# Patient Record
Sex: MALE | Race: WHITE | NOT HISPANIC OR LATINO | URBAN - METROPOLITAN AREA
[De-identification: names, ages, dates, MRNs, and addresses within clinical notes are randomized per-mention and may not be internally consistent; named-entity substitution may affect disease eponyms.]

---

## 2023-08-14 ENCOUNTER — HOSPITAL ENCOUNTER (OUTPATIENT)
Dept: DATA CONVERSION | Facility: HOSPITAL | Age: 53
Discharge: HOME | End: 2023-08-14

## 2023-08-14 DIAGNOSIS — S63.501D UNSPECIFIED SPRAIN OF RIGHT WRIST, SUBSEQUENT ENCOUNTER: ICD-10-CM

## 2023-08-14 DIAGNOSIS — R93.89 ABNORMAL FINDINGS ON DIAGNOSTIC IMAGING OF OTHER SPECIFIED BODY STRUCTURES: ICD-10-CM

## 2023-08-14 DIAGNOSIS — M25.531 PAIN IN RIGHT WRIST: ICD-10-CM

## 2023-08-14 DIAGNOSIS — S62.001D: ICD-10-CM

## 2023-09-16 VITALS
BODY MASS INDEX: 29.51 KG/M2 | DIASTOLIC BLOOD PRESSURE: 72 MMHG | WEIGHT: 188 LBS | HEIGHT: 67 IN | SYSTOLIC BLOOD PRESSURE: 112 MMHG | HEART RATE: 91 BPM

## 2023-10-04 PROBLEM — J45.909 ASTHMA (HHS-HCC): Status: ACTIVE | Noted: 2022-03-07

## 2023-10-04 PROBLEM — R07.9 CHEST PAIN: Status: ACTIVE | Noted: 2022-03-07

## 2023-10-04 PROBLEM — S69.90XA INJURY OF WRIST: Status: ACTIVE | Noted: 2023-10-04

## 2023-10-04 PROBLEM — R53.83 FATIGUE: Status: ACTIVE | Noted: 2020-03-09

## 2023-10-04 PROBLEM — R93.89 ABNORMAL X-RAY: Status: ACTIVE | Noted: 2023-10-04

## 2023-10-04 PROBLEM — S62.009A CLOSED FRACTURE OF SCAPHOID: Status: ACTIVE | Noted: 2023-10-04

## 2023-10-04 PROBLEM — R05.9 COUGH: Status: ACTIVE | Noted: 2020-03-19

## 2023-10-04 PROBLEM — G43.909 MIGRAINE: Status: ACTIVE | Noted: 2023-10-04

## 2023-10-04 PROBLEM — R25.1 TREMOR: Status: ACTIVE | Noted: 2023-07-07

## 2023-10-04 PROBLEM — G47.00 INSOMNIA: Status: ACTIVE | Noted: 2022-03-07

## 2023-10-04 PROBLEM — E66.9 OBESITY: Status: ACTIVE | Noted: 2022-03-03

## 2023-10-04 PROBLEM — S63.509A SPRAIN OF WRIST: Status: ACTIVE | Noted: 2023-10-04

## 2023-10-04 PROBLEM — F31.9 BIPOLAR DISORDER (MULTI): Status: ACTIVE | Noted: 2023-10-04

## 2023-10-04 PROBLEM — R10.32 LT GROIN PAIN: Status: ACTIVE | Noted: 2018-11-26

## 2023-10-04 PROBLEM — E66.3 OVERWEIGHT: Status: ACTIVE | Noted: 2020-03-09

## 2023-10-04 PROBLEM — M54.6 THORACIC BACK PAIN: Status: ACTIVE | Noted: 2019-02-13

## 2023-10-04 PROBLEM — M25.531 RIGHT WRIST PAIN: Status: ACTIVE | Noted: 2023-10-04

## 2023-10-04 PROBLEM — R63.4 WEIGHT LOSS: Status: ACTIVE | Noted: 2023-07-11

## 2023-10-04 RX ORDER — LAMOTRIGINE 150 MG/1
150 TABLET ORAL 2 TIMES DAILY
COMMUNITY
Start: 2018-11-26

## 2023-10-04 RX ORDER — PROPRANOLOL HYDROCHLORIDE 10 MG/1
10 TABLET ORAL 2 TIMES DAILY
COMMUNITY
Start: 2023-07-07

## 2023-10-04 RX ORDER — ALBUTEROL SULFATE 90 UG/1
2 AEROSOL, METERED RESPIRATORY (INHALATION) 4 TIMES DAILY PRN
COMMUNITY
Start: 2023-03-14 | End: 2023-12-18 | Stop reason: SDUPTHER

## 2023-10-04 RX ORDER — ESCITALOPRAM OXALATE 10 MG/1
10 TABLET ORAL DAILY
COMMUNITY

## 2023-10-06 ENCOUNTER — OFFICE VISIT (OUTPATIENT)
Dept: PRIMARY CARE | Facility: CLINIC | Age: 53
End: 2023-10-06
Payer: COMMERCIAL

## 2023-10-06 VITALS
SYSTOLIC BLOOD PRESSURE: 112 MMHG | OXYGEN SATURATION: 100 % | DIASTOLIC BLOOD PRESSURE: 74 MMHG | BODY MASS INDEX: 27.41 KG/M2 | HEART RATE: 70 BPM | WEIGHT: 175 LBS

## 2023-10-06 DIAGNOSIS — G47.00 INSOMNIA, UNSPECIFIED TYPE: Primary | ICD-10-CM

## 2023-10-06 PROCEDURE — 1036F TOBACCO NON-USER: CPT | Performed by: FAMILY MEDICINE

## 2023-10-06 PROCEDURE — 99213 OFFICE O/P EST LOW 20 MIN: CPT | Performed by: FAMILY MEDICINE

## 2023-10-06 NOTE — PROGRESS NOTES
Subjective   Patient ID: Portillo Barrios is a 53 y.o. male who presents for Insomnia (Pt states insomnia is chronic but the past few weeks are worse/Average sleep nightly 3-4 hours).    HPI Here with a 2 to 3-month history of poor sleep.  Patient works first shift and has to be at work by 5 to 5:30 in the morning so he is getting up at 430.  He tries to go to bed about 8:00 at night and he tosses and turns a lot.  He does not take naps after work.This is been ongoing for the past few months.  Before that he did not seem to have any problems initiating sleep or maintaining sleep.  Patient is on a mood stabilizer in the form of lamotrigine.He takes the medicine in the morning.Been on this medicine for years and he is stable.A med review shows that this medication can cause either insomnia or somnolence.    Review of Systems  Constitutional: Patient is negative for fever, fatigue, weight change.  HEENT: Patient is negative for change in vision, hearing, swallow.  Cardio: Patient is negative for chest pain, lower extremity edema.  Pulmonary: Patient is negative for cough, shortness of breath.  Neuro: Patient is positive for insomnia.    Objective   /74   Pulse 70   Wt 79.4 kg (175 lb)   SpO2 100%   BMI 27.41 kg/m²     Physical Exam  General: Awake and alert no apparent distress.  HEENT: Moist oral mucosa no cervical lymphadenopathy.  Cardio: Heart S1-S2 no murmur rub or gallop.  Assessment/Plan   Problem List Items Addressed This Visit             ICD-10-CM    Insomnia - Primary G47.00     Insomnia: Patient needs better control.  Patient was advised to try using OTC diphenhydramine as needed to try to reestablish his normal sleep-wake cycle.  If he is no better in a month he will follow-up here.

## 2023-12-18 ENCOUNTER — TELEPHONE (OUTPATIENT)
Dept: PRIMARY CARE | Facility: CLINIC | Age: 53
End: 2023-12-18
Payer: COMMERCIAL

## 2023-12-18 DIAGNOSIS — R05.9 COUGH, UNSPECIFIED TYPE: ICD-10-CM

## 2023-12-18 RX ORDER — ALBUTEROL SULFATE 90 UG/1
2 AEROSOL, METERED RESPIRATORY (INHALATION) 4 TIMES DAILY PRN
Qty: 18 G | Refills: 1 | Status: SHIPPED | OUTPATIENT
Start: 2023-12-18 | End: 2024-01-25 | Stop reason: SDUPTHER

## 2024-01-25 ENCOUNTER — OFFICE VISIT (OUTPATIENT)
Dept: PRIMARY CARE | Facility: CLINIC | Age: 54
End: 2024-01-25
Payer: COMMERCIAL

## 2024-01-25 ENCOUNTER — TELEPHONE (OUTPATIENT)
Dept: PRIMARY CARE | Facility: CLINIC | Age: 54
End: 2024-01-25

## 2024-01-25 VITALS
HEART RATE: 98 BPM | SYSTOLIC BLOOD PRESSURE: 144 MMHG | DIASTOLIC BLOOD PRESSURE: 88 MMHG | BODY MASS INDEX: 27.88 KG/M2 | OXYGEN SATURATION: 98 % | WEIGHT: 178 LBS

## 2024-01-25 DIAGNOSIS — Z00.00 ROUTINE GENERAL MEDICAL EXAMINATION AT A HEALTH CARE FACILITY: ICD-10-CM

## 2024-01-25 DIAGNOSIS — Z12.5 SPECIAL SCREENING FOR MALIGNANT NEOPLASM OF PROSTATE: ICD-10-CM

## 2024-01-25 DIAGNOSIS — R05.9 COUGH, UNSPECIFIED TYPE: ICD-10-CM

## 2024-01-25 DIAGNOSIS — R05.3 CHRONIC COUGH: Primary | ICD-10-CM

## 2024-01-25 DIAGNOSIS — E66.3 OVERWEIGHT: ICD-10-CM

## 2024-01-25 DIAGNOSIS — J45.40 MODERATE PERSISTENT ASTHMA, UNSPECIFIED WHETHER COMPLICATED (HHS-HCC): ICD-10-CM

## 2024-01-25 PROCEDURE — 1036F TOBACCO NON-USER: CPT | Performed by: FAMILY MEDICINE

## 2024-01-25 PROCEDURE — 99213 OFFICE O/P EST LOW 20 MIN: CPT | Performed by: FAMILY MEDICINE

## 2024-01-25 RX ORDER — ALBUTEROL SULFATE 90 UG/1
2 AEROSOL, METERED RESPIRATORY (INHALATION) 4 TIMES DAILY PRN
Qty: 18 G | Refills: 1 | Status: SHIPPED | OUTPATIENT
Start: 2024-01-25 | End: 2024-03-20 | Stop reason: SDUPTHER

## 2024-01-25 RX ORDER — BUDESONIDE AND FORMOTEROL FUMARATE DIHYDRATE 160; 4.5 UG/1; UG/1
2 AEROSOL RESPIRATORY (INHALATION)
Qty: 10.2 EACH | Refills: 3 | Status: SHIPPED | OUTPATIENT
Start: 2024-01-25 | End: 2025-01-24

## 2024-01-25 ASSESSMENT — PAIN SCALES - GENERAL: PAINLEVEL: 0-NO PAIN

## 2024-01-25 ASSESSMENT — PATIENT HEALTH QUESTIONNAIRE - PHQ9
2. FEELING DOWN, DEPRESSED OR HOPELESS: NOT AT ALL
1. LITTLE INTEREST OR PLEASURE IN DOING THINGS: NOT AT ALL
SUM OF ALL RESPONSES TO PHQ9 QUESTIONS 1 AND 2: 0

## 2024-01-25 NOTE — PROGRESS NOTES
Subjective   Patient ID: Portillo Barrios is a 54 y.o. male who presents for Follow-up (Pt here for insomnia and asthma.).    HPI Patient is here for follow-up to difficulty breathing.  Patient has had an asthma since he was a teenager but it was primarily exercise-induced.  He used albuterol for rescue occasionally.  Patient states that since he had his COVID illness about 4 months ago he has found that he is exquisitely sensitive to scents and perfumes, and cold air.  He is using his albuterol rescue twice a day.  He also is complaining of a cough.    Review of Systems  Constitutional: Patient is positive for mild increase in weight of about 10 pounds. Patient is negative for fever, fatigue.  HEENT: Patient is negative for change in vision, hearing, swallow.  Cardio: Patient is negative for chest pain, lower extremity edema.  Pulmonary: Patient is positive for cough with mild shortness of breath and wheeze.  Objective   /88   Pulse 98   Wt 80.7 kg (178 lb)   SpO2 98%   BMI 27.88 kg/m²     Physical Exam  General: Awake and alert no apparent distress.  HEENT: Moist oral mucosa no cervical lymphadenopathy.  Cardio: Heart S1-S2 no murmur rub or gallop.  Pulmonary: Lungs are clear to auscultation bilaterally.  Assessment/Plan   Problem List Items Addressed This Visit             ICD-10-CM    Cough - Primary  Needs workup.  Will get a chest x-ray. R05.9    Relevant Medications    albuterol (Proventil HFA) 90 mcg/actuation inhaler    Other Relevant Orders    XR chest 2 views    Asthma   Moderate, persistent.  Will begin budesonide/formoterol.  Patient will then uses albuterol for rescue.  Patient will follow-up in about a month. J45.909    Relevant Medications    budesonide-formoteroL (Symbicort) 160-4.5 mcg/actuation inhaler

## 2024-02-01 DIAGNOSIS — R05.9 COUGH, UNSPECIFIED TYPE: Primary | ICD-10-CM

## 2024-02-01 RX ORDER — FLUTICASONE FUROATE AND VILANTEROL 100; 25 UG/1; UG/1
1 POWDER RESPIRATORY (INHALATION) DAILY
Qty: 60 EACH | Refills: 2 | Status: SHIPPED | OUTPATIENT
Start: 2024-02-01

## 2024-02-06 ENCOUNTER — OFFICE VISIT (OUTPATIENT)
Dept: PRIMARY CARE | Facility: CLINIC | Age: 54
End: 2024-02-06
Payer: COMMERCIAL

## 2024-02-06 VITALS
DIASTOLIC BLOOD PRESSURE: 84 MMHG | BODY MASS INDEX: 28.66 KG/M2 | HEART RATE: 107 BPM | WEIGHT: 183 LBS | OXYGEN SATURATION: 98 % | SYSTOLIC BLOOD PRESSURE: 146 MMHG

## 2024-02-06 DIAGNOSIS — R06.83 SNORING: Primary | ICD-10-CM

## 2024-02-06 PROCEDURE — 99213 OFFICE O/P EST LOW 20 MIN: CPT | Performed by: FAMILY MEDICINE

## 2024-02-06 PROCEDURE — 1036F TOBACCO NON-USER: CPT | Performed by: FAMILY MEDICINE

## 2024-02-06 ASSESSMENT — PATIENT HEALTH QUESTIONNAIRE - PHQ9
SUM OF ALL RESPONSES TO PHQ9 QUESTIONS 1 AND 2: 0
1. LITTLE INTEREST OR PLEASURE IN DOING THINGS: NOT AT ALL
2. FEELING DOWN, DEPRESSED OR HOPELESS: NOT AT ALL

## 2024-02-06 ASSESSMENT — PAIN SCALES - GENERAL: PAINLEVEL: 0-NO PAIN

## 2024-02-06 NOTE — PROGRESS NOTES
Subjective   Patient ID: Portillo Barrios is a 54 y.o. male who presents for Sleep apena  and Headache (Not sleeping, cannot work, too tired, cannot function, causing anxiety and irritability.).    HPI Here for poor sleep and anxiety.  Patient has known anxiety and is worried because of his poor sleep.  He has had insomnia for months.  He cannot get into see his pulmonologist for about 3 months.  He states that his wife has witnessed him snoring quite loudly and has had observed apneic episodes.    Review of Systems  Constitutional: Patient is positive for fatigue.  He is somnolent during the day.  He is negative for fever, weight change.  HEENT: Patient is negative for change in vision, hearing, swallow.  Cardio: Patient is negative for chest pain, lower extremity edema.  Pulmonary: Patient is negative for cough, shortness of breath.  Neuro: Patient is positive for snoring.  Objective   /84   Pulse 107   Wt 83 kg (183 lb)   SpO2 98%   BMI 28.66 kg/m²     Physical Exam  General: Awake and alert no apparent distress.  HEENT: Moist oral mucosa no cervical lymphadenopathy.  Cardio: Heart S1-S2 no murmur rub or gallop.  Pulmonary: Lungs clear to auscultation bilaterally.  Assessment/Plan   Problem List Items Addressed This Visit    None  Visit Diagnoses         Codes    Snoring    -  Primary  Needs workup.  Patient will follow-up with sleep specialist within this practice for a possible sleep study. R06.83

## 2024-02-29 ENCOUNTER — OFFICE VISIT (OUTPATIENT)
Dept: PRIMARY CARE | Facility: CLINIC | Age: 54
End: 2024-02-29
Payer: COMMERCIAL

## 2024-02-29 VITALS
SYSTOLIC BLOOD PRESSURE: 128 MMHG | OXYGEN SATURATION: 97 % | DIASTOLIC BLOOD PRESSURE: 80 MMHG | BODY MASS INDEX: 28.19 KG/M2 | WEIGHT: 180 LBS | RESPIRATION RATE: 16 BRPM | HEART RATE: 65 BPM

## 2024-02-29 DIAGNOSIS — R53.82 CHRONIC FATIGUE: Primary | ICD-10-CM

## 2024-02-29 PROCEDURE — 99214 OFFICE O/P EST MOD 30 MIN: CPT | Performed by: FAMILY MEDICINE

## 2024-02-29 PROCEDURE — 1036F TOBACCO NON-USER: CPT | Performed by: FAMILY MEDICINE

## 2024-02-29 ASSESSMENT — PATIENT HEALTH QUESTIONNAIRE - PHQ9
SUM OF ALL RESPONSES TO PHQ9 QUESTIONS 1 AND 2: 0
2. FEELING DOWN, DEPRESSED OR HOPELESS: NOT AT ALL
1. LITTLE INTEREST OR PLEASURE IN DOING THINGS: NOT AT ALL

## 2024-02-29 ASSESSMENT — ENCOUNTER SYMPTOMS
OCCASIONAL FEELINGS OF UNSTEADINESS: 0
LOSS OF SENSATION IN FEET: 0
DEPRESSION: 0

## 2024-02-29 ASSESSMENT — PAIN SCALES - GENERAL: PAINLEVEL: 0-NO PAIN

## 2024-02-29 NOTE — ASSESSMENT & PLAN NOTE
I suspect possible obstructive sleep apnea.  Will set up home sleep study test.  Follow-up here in 3 to 4 weeks to review.

## 2024-02-29 NOTE — PROGRESS NOTES
Subjective   Patient ID: Portillo Barrios is a 54 y.o. male who presents for Insomnia (Discuss having a sleep study done).    HPI   Feels very fatigued throughout the day.  Does not feel rested when waking up from sleep. Snores on a regular basis.  Family members noticed episodes of apnea while sleeping. Often naps or falls asleep throughout the day.    Review of Systems  Denies headache, blurred vision, chest pain, shortness of breath, nausea or vomiting, change in bowel habits or leg pain or swelling.    Objective   /80 (BP Location: Left arm, Patient Position: Sitting, BP Cuff Size: Large adult)   Pulse 65   Resp 16   Wt 81.6 kg (180 lb)   SpO2 97%   BMI 28.19 kg/m²     Physical Exam  Vitals and nurse's notes reviewed  General: no acute distress  HEENT: Normal  Neck: Supple  Lungs: Clear  Cardio: RRR w/o murmur  Extremities: No edema, no calf tenderness  Neuro: Alert and oriented with no focal deficits    Assessment/Plan   Problem List Items Addressed This Visit             ICD-10-CM       Medium    Chronic fatigue - Primary R53.82     I suspect possible obstructive sleep apnea.  Will set up home sleep study test.  Follow-up here in 3 to 4 weeks to review.

## 2024-03-12 ENCOUNTER — LAB (OUTPATIENT)
Dept: LAB | Facility: LAB | Age: 54
End: 2024-03-12
Payer: COMMERCIAL

## 2024-03-12 ENCOUNTER — HOSPITAL ENCOUNTER (OUTPATIENT)
Dept: RADIOLOGY | Facility: CLINIC | Age: 54
Discharge: HOME | End: 2024-03-12
Payer: COMMERCIAL

## 2024-03-12 DIAGNOSIS — Z12.5 SPECIAL SCREENING FOR MALIGNANT NEOPLASM OF PROSTATE: ICD-10-CM

## 2024-03-12 DIAGNOSIS — R05.3 CHRONIC COUGH: ICD-10-CM

## 2024-03-12 DIAGNOSIS — Z00.00 ROUTINE GENERAL MEDICAL EXAMINATION AT A HEALTH CARE FACILITY: ICD-10-CM

## 2024-03-12 LAB
ALBUMIN SERPL-MCNC: 4.3 G/DL (ref 3.5–5)
ALP BLD-CCNC: 53 U/L (ref 35–125)
ALT SERPL-CCNC: 31 U/L (ref 5–40)
ANION GAP SERPL CALC-SCNC: 12 MMOL/L
APPEARANCE UR: CLEAR
AST SERPL-CCNC: 28 U/L (ref 5–40)
BASOPHILS # BLD AUTO: 0.08 X10*3/UL (ref 0–0.1)
BASOPHILS NFR BLD AUTO: 1 %
BILIRUB SERPL-MCNC: 1.4 MG/DL (ref 0.1–1.2)
BILIRUB UR STRIP.AUTO-MCNC: NEGATIVE MG/DL
BUN SERPL-MCNC: 11 MG/DL (ref 8–25)
CALCIUM SERPL-MCNC: 9.4 MG/DL (ref 8.5–10.4)
CHLORIDE SERPL-SCNC: 101 MMOL/L (ref 97–107)
CHOLEST SERPL-MCNC: 135 MG/DL (ref 133–200)
CHOLEST/HDLC SERPL: 3.4 {RATIO}
CO2 SERPL-SCNC: 27 MMOL/L (ref 24–31)
COLOR UR: NORMAL
CREAT SERPL-MCNC: 1.3 MG/DL (ref 0.4–1.6)
EGFRCR SERPLBLD CKD-EPI 2021: 66 ML/MIN/1.73M*2
EOSINOPHIL # BLD AUTO: 0.19 X10*3/UL (ref 0–0.7)
EOSINOPHIL NFR BLD AUTO: 2.5 %
ERYTHROCYTE [DISTWIDTH] IN BLOOD BY AUTOMATED COUNT: 13.1 % (ref 11.5–14.5)
EST. AVERAGE GLUCOSE BLD GHB EST-MCNC: 111 MG/DL
GLUCOSE SERPL-MCNC: 99 MG/DL (ref 65–99)
GLUCOSE UR STRIP.AUTO-MCNC: NORMAL MG/DL
HBA1C MFR BLD: 5.5 %
HCT VFR BLD AUTO: 48.7 % (ref 41–52)
HDLC SERPL-MCNC: 40 MG/DL
HGB BLD-MCNC: 16.2 G/DL (ref 13.5–17.5)
IMM GRANULOCYTES # BLD AUTO: 0.03 X10*3/UL (ref 0–0.7)
IMM GRANULOCYTES NFR BLD AUTO: 0.4 % (ref 0–0.9)
KETONES UR STRIP.AUTO-MCNC: NEGATIVE MG/DL
LDLC SERPL CALC-MCNC: 72 MG/DL (ref 65–130)
LEUKOCYTE ESTERASE UR QL STRIP.AUTO: NEGATIVE
LYMPHOCYTES # BLD AUTO: 1.58 X10*3/UL (ref 1.2–4.8)
LYMPHOCYTES NFR BLD AUTO: 20.4 %
MCH RBC QN AUTO: 30.2 PG (ref 26–34)
MCHC RBC AUTO-ENTMCNC: 33.3 G/DL (ref 32–36)
MCV RBC AUTO: 91 FL (ref 80–100)
MONOCYTES # BLD AUTO: 0.72 X10*3/UL (ref 0.1–1)
MONOCYTES NFR BLD AUTO: 9.3 %
NEUTROPHILS # BLD AUTO: 5.15 X10*3/UL (ref 1.2–7.7)
NEUTROPHILS NFR BLD AUTO: 66.4 %
NITRITE UR QL STRIP.AUTO: NEGATIVE
NRBC BLD-RTO: 0 /100 WBCS (ref 0–0)
PH UR STRIP.AUTO: 6.5 [PH]
PLATELET # BLD AUTO: 247 X10*3/UL (ref 150–450)
POTASSIUM SERPL-SCNC: 4.5 MMOL/L (ref 3.4–5.1)
PROT SERPL-MCNC: 6.4 G/DL (ref 5.9–7.9)
PROT UR STRIP.AUTO-MCNC: NEGATIVE MG/DL
PSA SERPL-MCNC: 0.5 NG/ML
RBC # BLD AUTO: 5.36 X10*6/UL (ref 4.5–5.9)
RBC # UR STRIP.AUTO: NEGATIVE /UL
SODIUM SERPL-SCNC: 140 MMOL/L (ref 133–145)
SP GR UR STRIP.AUTO: 1.02
TRIGL SERPL-MCNC: 117 MG/DL (ref 40–150)
UROBILINOGEN UR STRIP.AUTO-MCNC: NORMAL MG/DL
WBC # BLD AUTO: 7.8 X10*3/UL (ref 4.4–11.3)

## 2024-03-12 PROCEDURE — 81003 URINALYSIS AUTO W/O SCOPE: CPT

## 2024-03-12 PROCEDURE — 84153 ASSAY OF PSA TOTAL: CPT

## 2024-03-12 PROCEDURE — 83036 HEMOGLOBIN GLYCOSYLATED A1C: CPT

## 2024-03-12 PROCEDURE — 71046 X-RAY EXAM CHEST 2 VIEWS: CPT

## 2024-03-12 PROCEDURE — 36415 COLL VENOUS BLD VENIPUNCTURE: CPT

## 2024-03-12 PROCEDURE — 80061 LIPID PANEL: CPT

## 2024-03-12 PROCEDURE — 80053 COMPREHEN METABOLIC PANEL: CPT

## 2024-03-12 PROCEDURE — 85025 COMPLETE CBC W/AUTO DIFF WBC: CPT

## 2024-03-12 PROCEDURE — 71046 X-RAY EXAM CHEST 2 VIEWS: CPT | Performed by: RADIOLOGY

## 2024-03-20 ENCOUNTER — OFFICE VISIT (OUTPATIENT)
Dept: PRIMARY CARE | Facility: CLINIC | Age: 54
End: 2024-03-20
Payer: COMMERCIAL

## 2024-03-20 VITALS
OXYGEN SATURATION: 97 % | BODY MASS INDEX: 29.57 KG/M2 | SYSTOLIC BLOOD PRESSURE: 144 MMHG | DIASTOLIC BLOOD PRESSURE: 84 MMHG | HEART RATE: 66 BPM | WEIGHT: 184 LBS | HEIGHT: 66 IN

## 2024-03-20 DIAGNOSIS — J45.909 ASTHMA WITHOUT STATUS ASTHMATICUS WITHOUT COMPLICATION, UNSPECIFIED ASTHMA SEVERITY, UNSPECIFIED WHETHER PERSISTENT (HHS-HCC): ICD-10-CM

## 2024-03-20 DIAGNOSIS — Z23 IMMUNIZATION DUE: ICD-10-CM

## 2024-03-20 DIAGNOSIS — Z00.00 WELL ADULT EXAM: ICD-10-CM

## 2024-03-20 DIAGNOSIS — R53.82 CHRONIC FATIGUE: Primary | ICD-10-CM

## 2024-03-20 DIAGNOSIS — R05.9 COUGH, UNSPECIFIED TYPE: ICD-10-CM

## 2024-03-20 PROCEDURE — 99396 PREV VISIT EST AGE 40-64: CPT | Performed by: FAMILY MEDICINE

## 2024-03-20 PROCEDURE — 90750 HZV VACC RECOMBINANT IM: CPT | Performed by: FAMILY MEDICINE

## 2024-03-20 PROCEDURE — 1036F TOBACCO NON-USER: CPT | Performed by: FAMILY MEDICINE

## 2024-03-20 PROCEDURE — 93000 ELECTROCARDIOGRAM COMPLETE: CPT | Performed by: FAMILY MEDICINE

## 2024-03-20 PROCEDURE — 90471 IMMUNIZATION ADMIN: CPT | Performed by: FAMILY MEDICINE

## 2024-03-20 RX ORDER — ALBUTEROL SULFATE 90 UG/1
2 AEROSOL, METERED RESPIRATORY (INHALATION) EVERY 6 HOURS PRN
Qty: 18 G | Refills: 1 | Status: SHIPPED | OUTPATIENT
Start: 2024-03-20

## 2024-03-20 ASSESSMENT — PATIENT HEALTH QUESTIONNAIRE - PHQ9
1. LITTLE INTEREST OR PLEASURE IN DOING THINGS: NOT AT ALL
SUM OF ALL RESPONSES TO PHQ9 QUESTIONS 1 AND 2: 0
2. FEELING DOWN, DEPRESSED OR HOPELESS: NOT AT ALL

## 2024-03-20 ASSESSMENT — PAIN SCALES - GENERAL: PAINLEVEL: 0-NO PAIN

## 2024-03-20 NOTE — PROGRESS NOTES
Subjective   Patient ID: Portillo Barrios is a 54 y.o. male who presents for Annual Exam (Colonoscopy  6/21/2022;  PSA done 3/12/2024/Chest discomfort and rash on chest earlier today;  rash and discomfort resolved now).    HPI Here for complete physical exam.  Patient has a history of weather induced shortness of breath . He uses albuterol MDI as needed . He uses less than 2 MDI per year .  A recent chest x-ray for shortness of breath was negative.  Patient sees a specialist for mood variability. He is currently on lamotrigine..   Patient had a tetanus shot in 2014. He received Shingrix #1 in 2023.He has not been immunized against pneumococcal disease. He has been immunized against coronavirus x2. He has been immunized against influenza in 10/22.  Patient had a colonoscopy in 2022.   Patient has never used tobacco and does not use alcohol.    Review of Systems  Constitutional Symptoms: He is positive for weight loss. Is positive for fatigue.He is negative for fever, night sweats, weight loss, Weight loss due to diet, weight gain due to diet, unexpected weight gain, loss of appetite, increased appetite, headaches, abnormal activity level, Sleep Disturbance, Recent Illness.   Eyes: He is negative for blindness, blind spots, loss and blurring of vision, double vision, swelling, redness, pruritus, eye pain, discharge, dryness of eyes.   Ear, Nose, Mouth, Throat: He is negative for hearing loss, wearing hearing aids, tinnitus, ear pain, ear drainage, dizziness, allergies, nasal congestion, rhinorrhea, nasal obstruction, post nasal drip, nose bleeds, teeth problems, wearing dentures, mouth sores, gum disease, dysphagia, hoarseness, sore throat, tinnitus, sleep apnea.   Cardiovascular: He is negative for chest pain/pressure, radiation of pain, palpitations, shortness of breath, dyspnea on exertion, orthopnea, syncope, diaphoresis, cyanosis, edema.   Respiratory: He is positive for shortness of breath due to asthma.He is  "negative for shortness of breath, dyspnea on exertion, coughing, sputum, hemoptysis, wheezing, snoring.   Breast: He is negative for masses, nipple discharge, gynecomastia.   Gastrointestinal: He is negative for anorexia, indigestion, increased belching, food intolerance, use of antacids, nausea, hematemesis, jaundice, abdominal pain, change in bowel habits, diarrhea, constipation, abnormal stools, hematochezia, melena, blood in stool, increased flatus, hemorrhoids.   Male Genitourinary: He is negative for erectile dysfunction, frequency, nocturia, hesitancy, dribbling, Incontinence, dysuria, hematuria, Swelling of penis, testicular pain or swelling, Hematospermia, urethral discharge.   Musculoskeletal: He is negative for joint pain, joint swelling, joint warmth, joint redness, myalgias, cramps, nocturnal muscle cramps, weakness, stiffness, limitation of motion.   Integumentary: He is negative for change in mole, skin trouble or rash, itching, dryness, loss of hair, hirsutism.   Neurological: He is negative for headache, speech difficulty, numbness, tingling, weakness, paralysis, tremors, dizziness, syncope, balance problems, memory loss.   Psychiatric: Is positive for mood variability. He is negative for depression, moodiness, anhedonia, change in sleep pattern, disturbing thoughts or feelings, change in libido, suicidal thoughts or attempts, anxiety, panic attacks, obsessive thoughts, compulsions, hyperactivity.   Endocrine: He is negative for weight gain, heat or cold intolerance, excessive sweating, polydipsia.   Hematologic/Lymphatic: He is negative for bruising, abnormal bleeding, bleeding gums, nose bleeds, swollen glands.  Objective   /84   Pulse 66   Ht 1.67 m (5' 5.75\")   Wt 83.5 kg (184 lb)   SpO2 97%   BMI 29.92 kg/m²     Physical Exam  General Appearance: Pt is in no acute distress. He is well nourished, well developed. The patient is awake and alert and appears stated age.  Head: Pt's hair " pattern is normal for patients age and The scalp is normal . The skull is normocephalic, atraumatic. The face is unremarkable with no facial droop. Palpation of the head reveals no tenderness or masses.  Eyes: PERRLA, EOMI, no scleral icterus. Normal position and alignment. Eyebrows are normal.  Ears, Nose, Mouth, Throat:   EARS: External bilateral ears reveal normal helix, tragus and ear lobe. Both canals are normal. Tympanic membranes are pearly gray, normal landmarks, good light reflex.   MOUTH: Lips are normal with no lesion. Oral mucosa is moist. Hard and soft palates are normal. Teeth are in good repair. Tongue reveals is normal. Tonsils are present with no lesions. Uvula is normal. Posterior pharynx without lesions.  Neck: Inspection of the neck reveals no masses or JVD.   Inspection reveals normal thyroid gland. Palpation shows normal thyroid gland. with No carotid bruit present.   Anterior cervical lymph node chains are unremarkable. Posterior chains are unremarkable.  Chest: Chest is symmetric. Lungs are clear to auscultation and percussion, no respiratory distress. Breathing is normal.  Cardiovascular: Heart is RRR, normal S1, S2. No murmurs, rubs or gallops. PMI is normal in left 6th IC space midclavicular line. Femoral pulses are present and without bruits. DP pulses are present. Extremities: no edema, clubbing, cyanosis, or varicosities.  Breast: INSPECTION: Size and symmetry: Breasts are normal and symmetric .  Abdomen: Abdomen is soft, NT, ND. BS + 4 quadrants. No organomegaly or masses..   Genitourinary: Genital exam: Penis is not circumcised without lesion or discharge.   Lymph Nodes: Palpation of the cervical area are within normal limit. Palpation of the axillary area are within normal limit. Palpation of the inguinal area are within normal limit.  Musculoskeletal: Gait is normal. Extremities: Full Range of motion and strength throughout.  Skin: Skin has no bruising, rash, eruptions, or abnormal  appearing lesions. Has dark tag at base of neck, posteriorly.  Neurological: Intact and non-focal. Cranial nerves II - XII are grossly intact. Motor exams reveals normal tone and strength , Sensation: normal to touch, position and vibration. DTR: are +2/4 and symmetric at the AJ and KJ and no Babinski.  Psychiatric: Proper orientation to person, place and time. Recent memory is intact. Remote memory is intact. Patient's mood is normal with good eye contact. Affect is appropriate.  Assessment/Plan   Problem List Items Addressed This Visit             ICD-10-CM    Cough    intermittent.  Continue on albuterol as needed. R05.9    Relevant Medications    albuterol (Proventil HFA) 90 mcg/actuation inhaler    Chronic fatigue - Primary   chronic, intermittent.  Patient is currently being worked up for sleep apnea. R53.82    Relevant Orders    ECG 12 Lead     Other Visit Diagnoses         Codes    Well adult exam    normal exam. Z00.00    Relevant Orders    ECG 12 Lead    Asthma without status asthmaticus without complication, unspecified asthma severity, unspecified whether persistent    intermittent. J45.909    Immunization due    needs better control.  Patient received shingles immunization #2.  Will consider tetanus shot in the future. Z23    Relevant Orders    Zoster vaccine, recombinant, adult (SHINGRIX)

## 2024-03-29 ENCOUNTER — APPOINTMENT (OUTPATIENT)
Dept: PRIMARY CARE | Facility: CLINIC | Age: 54
End: 2024-03-29
Payer: COMMERCIAL

## 2024-04-15 ENCOUNTER — OFFICE VISIT (OUTPATIENT)
Dept: PRIMARY CARE | Facility: CLINIC | Age: 54
End: 2024-04-15
Payer: COMMERCIAL

## 2024-04-15 ENCOUNTER — TELEPHONE (OUTPATIENT)
Dept: PRIMARY CARE | Facility: CLINIC | Age: 54
End: 2024-04-15

## 2024-04-15 VITALS
DIASTOLIC BLOOD PRESSURE: 70 MMHG | OXYGEN SATURATION: 97 % | WEIGHT: 181 LBS | BODY MASS INDEX: 29.44 KG/M2 | HEART RATE: 86 BPM | SYSTOLIC BLOOD PRESSURE: 116 MMHG | RESPIRATION RATE: 18 BRPM

## 2024-04-15 DIAGNOSIS — G47.33 OBSTRUCTIVE SLEEP APNEA: Primary | ICD-10-CM

## 2024-04-15 PROCEDURE — 99213 OFFICE O/P EST LOW 20 MIN: CPT | Performed by: FAMILY MEDICINE

## 2024-04-15 PROCEDURE — 1036F TOBACCO NON-USER: CPT | Performed by: FAMILY MEDICINE

## 2024-04-15 ASSESSMENT — ENCOUNTER SYMPTOMS
LOSS OF SENSATION IN FEET: 0
DEPRESSION: 0
OCCASIONAL FEELINGS OF UNSTEADINESS: 0

## 2024-04-15 ASSESSMENT — PAIN SCALES - GENERAL: PAINLEVEL: 0-NO PAIN

## 2024-04-15 NOTE — PROGRESS NOTES
Subjective   Patient ID: Portillo Barrios is a 54 y.o. male who presents for Results (Patient is here to go over his sleep study result).    HPI   He is here for follow-up of sleep study which showed significant sleep apnea with a AHI of 37.6.    Review of Systems  Denies headache, blurred vision, chest pain, shortness of breath, nausea or vomiting, change in bowel habits or leg pain or swelling.    Objective   /70 (BP Location: Left arm, Patient Position: Sitting, BP Cuff Size: Large adult)   Pulse 86   Resp 18   Wt 82.1 kg (181 lb)   SpO2 97%   BMI 29.44 kg/m²     Physical Exam  Vitals and nurse's notes reviewed  General: no acute distress  HEENT: Normal  Neck: Supple  Lungs: Clear  Cardio: RRR w/o murmur  Extremities: No edema, no calf tenderness  Neuro: Alert and oriented with no focal deficits    Assessment/Plan   Problem List Items Addressed This Visit             ICD-10-CM       Medium    Obstructive sleep apnea - Primary G47.33     Will set up CPAP therapy.  Return here in 3 to 4 months to review.

## 2024-04-30 ENCOUNTER — OFFICE VISIT (OUTPATIENT)
Dept: PRIMARY CARE | Facility: CLINIC | Age: 54
End: 2024-04-30
Payer: COMMERCIAL

## 2024-04-30 VITALS
WEIGHT: 182 LBS | OXYGEN SATURATION: 96 % | BODY MASS INDEX: 29.25 KG/M2 | SYSTOLIC BLOOD PRESSURE: 134 MMHG | DIASTOLIC BLOOD PRESSURE: 84 MMHG | HEIGHT: 66 IN | HEART RATE: 82 BPM

## 2024-04-30 DIAGNOSIS — N52.9 VASCULOGENIC ERECTILE DYSFUNCTION, UNSPECIFIED VASCULOGENIC ERECTILE DYSFUNCTION TYPE: Primary | ICD-10-CM

## 2024-04-30 DIAGNOSIS — Z72.51 HIGH RISK HETEROSEXUAL BEHAVIOR: ICD-10-CM

## 2024-04-30 DIAGNOSIS — G47.33 OBSTRUCTIVE SLEEP APNEA: ICD-10-CM

## 2024-04-30 PROCEDURE — 99214 OFFICE O/P EST MOD 30 MIN: CPT | Performed by: FAMILY MEDICINE

## 2024-04-30 PROCEDURE — 1036F TOBACCO NON-USER: CPT | Performed by: FAMILY MEDICINE

## 2024-04-30 RX ORDER — SILDENAFIL 50 MG/1
50 TABLET, FILM COATED ORAL DAILY PRN
Qty: 30 TABLET | Refills: 3 | Status: SHIPPED | OUTPATIENT
Start: 2024-04-30 | End: 2025-04-30

## 2024-04-30 ASSESSMENT — PATIENT HEALTH QUESTIONNAIRE - PHQ9
1. LITTLE INTEREST OR PLEASURE IN DOING THINGS: NOT AT ALL
2. FEELING DOWN, DEPRESSED OR HOPELESS: NOT AT ALL
SUM OF ALL RESPONSES TO PHQ9 QUESTIONS 1 AND 2: 0

## 2024-04-30 ASSESSMENT — PAIN SCALES - GENERAL: PAINLEVEL: 0-NO PAIN

## 2024-04-30 NOTE — PROGRESS NOTES
"Subjective   Patient ID: Portillo Barrios is a 54 y.o. male who presents for Follow-up (Would like to discuss getting STD testing,  going into a new relationship).    HPI here for multiple issues.  Patient is starting a new relationship and was decided him and his girlfriend wanted to be tested for sexually transmitted illnesses before the begin a sexual relationship.  Patient has never had any sexually transmitted diseases.  Patient also has recent issues with erectile dysfunction.  Patient has recently tested positive for obstructive sleep apnea.  He is awaiting set up of his CPAP/BiPAP machine.  He is hoping this will give him more energy and getting a restful sleep.    Review of Systems  Constitutional: Patient is positive for fatigue.  He is negative for fever, weight change.  HEENT: Patient is negative for change in vision, hearing, swallow.  Cardio: Patient is negative for chest pain, lower extremity edema.  Pulmonary: Patient is positive for obstructive sleep apnea.   Patient is negative for cough, shortness of breath.  Genitourinary: Patient is positive for erectile dysfunction.  Objective   /84   Pulse 82   Ht 1.67 m (5' 5.75\")   Wt 82.6 kg (182 lb)   SpO2 96%   BMI 29.60 kg/m²     Physical Exam  General: Awake and alert no apparent distress.  HEENT: Moist oral mucosa no cervical lymphadenopathy  Cardiac: Heart S1-S2 no murmur rub or gallop.  Pulmonary: Lungs clear to auscultation bilaterally.  Assessment/Plan   Problem List Items Addressed This Visit             ICD-10-CM    Obstructive sleep apnea   needs better control.  Patient awaiting his equipment for home use. G47.33     Other Visit Diagnoses         Codes    Vasculogenic erectile dysfunction, unspecified vasculogenic erectile dysfunction type    -  Primary   needs better control.  Will give a trial of sildenafil 50 mg tablets.  Patient was advised that if he feels he needs to increase she can go to 2 tablets per administration but he is " not to go any higher than that dosage. N52.9    Relevant Medications    sildenafil (Viagra) 50 mg tablet    High risk heterosexual behavior    needs workup.  Will get lab work. Z72.51    Relevant Orders    C. Trachomatis / N. Gonorrhoeae, Amplified Detection    Hepatitis C Antibody    HIV 1/2 Antigen/Antibody Screen with Reflex to Confirmation    Syphilis Screen with Reflex

## 2024-05-17 ENCOUNTER — LAB (OUTPATIENT)
Dept: LAB | Facility: LAB | Age: 54
End: 2024-05-17
Payer: COMMERCIAL

## 2024-05-17 DIAGNOSIS — Z72.51 HIGH RISK HETEROSEXUAL BEHAVIOR: ICD-10-CM

## 2024-05-17 PROCEDURE — 86780 TREPONEMA PALLIDUM: CPT

## 2024-05-17 PROCEDURE — 87389 HIV-1 AG W/HIV-1&-2 AB AG IA: CPT

## 2024-05-17 PROCEDURE — 87591 N.GONORRHOEAE DNA AMP PROB: CPT

## 2024-05-17 PROCEDURE — 87491 CHLMYD TRACH DNA AMP PROBE: CPT

## 2024-05-17 PROCEDURE — 86803 HEPATITIS C AB TEST: CPT

## 2024-05-17 PROCEDURE — 36415 COLL VENOUS BLD VENIPUNCTURE: CPT

## 2024-05-18 LAB
C TRACH RRNA SPEC QL NAA+PROBE: NEGATIVE
HCV AB SER QL: NONREACTIVE
HIV 1+2 AB+HIV1 P24 AG SERPL QL IA: NONREACTIVE
N GONORRHOEA DNA SPEC QL PROBE+SIG AMP: NEGATIVE
TREPONEMA PALLIDUM IGG+IGM AB [PRESENCE] IN SERUM OR PLASMA BY IMMUNOASSAY: NONREACTIVE

## 2024-07-15 ENCOUNTER — APPOINTMENT (OUTPATIENT)
Dept: PRIMARY CARE | Facility: CLINIC | Age: 54
End: 2024-07-15
Payer: COMMERCIAL

## 2024-07-15 VITALS
DIASTOLIC BLOOD PRESSURE: 80 MMHG | RESPIRATION RATE: 16 BRPM | SYSTOLIC BLOOD PRESSURE: 110 MMHG | HEART RATE: 83 BPM | WEIGHT: 181 LBS | OXYGEN SATURATION: 97 % | BODY MASS INDEX: 29.44 KG/M2

## 2024-07-15 DIAGNOSIS — G47.33 OBSTRUCTIVE SLEEP APNEA: Primary | ICD-10-CM

## 2024-07-15 PROCEDURE — 1036F TOBACCO NON-USER: CPT | Performed by: FAMILY MEDICINE

## 2024-07-15 PROCEDURE — 99213 OFFICE O/P EST LOW 20 MIN: CPT | Performed by: FAMILY MEDICINE

## 2024-07-15 ASSESSMENT — ENCOUNTER SYMPTOMS
LOSS OF SENSATION IN FEET: 0
OCCASIONAL FEELINGS OF UNSTEADINESS: 0
DEPRESSION: 0

## 2024-07-15 ASSESSMENT — PAIN SCALES - GENERAL: PAINLEVEL: 0-NO PAIN

## 2024-07-15 ASSESSMENT — PATIENT HEALTH QUESTIONNAIRE - PHQ9
2. FEELING DOWN, DEPRESSED OR HOPELESS: NOT AT ALL
SUM OF ALL RESPONSES TO PHQ9 QUESTIONS 1 AND 2: 0
1. LITTLE INTEREST OR PLEASURE IN DOING THINGS: NOT AT ALL

## 2024-07-15 NOTE — PROGRESS NOTES
Subjective   Patient ID: Portillo Barrios is a 54 y.o. male who presents for Follow-up (Patient is here for a follow up on his sleep apnea).    HPI   He is here for follow-up of obstructive sleep apnea.  He was started on CPAP therapy after his sleep study in 4/24 showed significant obstructive sleep apnea with an AHI of 28.5. Since using the CPAP he has felt that he is getting better quality sleep and wakes up feeling more refreshed.  He has more energy throughout the day. CPAP shows AHI of 1-3.    Review of Systems  Denies headache, blurred vision, chest pain, shortness of breath, nausea or vomiting, change in bowel habits or leg pain or swelling.    Objective   /80 (BP Location: Left arm, Patient Position: Sitting, BP Cuff Size: Large adult)   Pulse 83   Resp 16   Wt 82.1 kg (181 lb)   SpO2 97%   BMI 29.44 kg/m²     Physical Exam  Vitals and nurse's notes reviewed  General: no acute distress  HEENT: Normal  Neck: Supple  Lungs: Clear  Cardio: RRR w/o murmur  Extremities: No edema, no calf tenderness  Neuro: Alert and oriented with no focal deficits    Assessment/Plan   Problem List Items Addressed This Visit             ICD-10-CM       Medium    Obstructive sleep apnea - Primary G47.33     I recommend he continue with CPAP therapy as she is benefiting from it.  Follow-up with me on a PRN basis.  Continue following with his primary care doctor.

## 2024-07-15 NOTE — ASSESSMENT & PLAN NOTE
I recommend he continue with CPAP therapy as she is benefiting from it.  Follow-up with me on a PRN basis.  Continue following with his primary care doctor.

## 2024-07-18 ENCOUNTER — APPOINTMENT (OUTPATIENT)
Dept: PRIMARY CARE | Facility: CLINIC | Age: 54
End: 2024-07-18
Payer: COMMERCIAL

## 2024-08-27 ENCOUNTER — APPOINTMENT (OUTPATIENT)
Dept: PRIMARY CARE | Facility: CLINIC | Age: 54
End: 2024-08-27
Payer: COMMERCIAL

## 2024-08-27 VITALS
OXYGEN SATURATION: 94 % | DIASTOLIC BLOOD PRESSURE: 74 MMHG | SYSTOLIC BLOOD PRESSURE: 124 MMHG | WEIGHT: 181 LBS | BODY MASS INDEX: 29.44 KG/M2 | HEART RATE: 91 BPM | RESPIRATION RATE: 16 BRPM

## 2024-08-27 DIAGNOSIS — G47.33 OBSTRUCTIVE SLEEP APNEA: Primary | ICD-10-CM

## 2024-08-27 PROCEDURE — 1036F TOBACCO NON-USER: CPT | Performed by: FAMILY MEDICINE

## 2024-08-27 PROCEDURE — 99213 OFFICE O/P EST LOW 20 MIN: CPT | Performed by: FAMILY MEDICINE

## 2024-08-27 ASSESSMENT — ENCOUNTER SYMPTOMS
LOSS OF SENSATION IN FEET: 0
DEPRESSION: 0
OCCASIONAL FEELINGS OF UNSTEADINESS: 0

## 2024-08-27 ASSESSMENT — PAIN SCALES - GENERAL: PAINLEVEL: 0-NO PAIN

## 2024-08-27 NOTE — PROGRESS NOTES
Subjective   Patient ID: Portillo Barrios is a 54 y.o. male who presents for No chief complaint on file..    HPI   He is here for follow-up of obstructive sleep apnea.  He was diagnosed with obstructive sleep apnea via home sleep study by me.  CPAP was started in 4/24.  He follow-up with me on 7/15/2024 and he was doing well with improved sleep and more energy. Since then he has had trouble keeping the mask on all night because he feels like there is too much air coming out.  It wakes him up.  He often feels bloated through the day.  Some nights he can sleep through the night but most nights he is interrupted with the symptoms.  He is wearing the nasal pillow.  He is spoken with [employer] the CPAP distributor and after trying some variations including buying a specific pillow he decided to come back in here for advice.  He states that his events per hour are low.  He he states that when he spoke with [facility] there respiratory therapist said that his highest pressure readings were 12 even though the range of the machine is set from 4-20.  Review of Systems  Denies headache, blurred vision, chest pain, shortness of breath, nausea or vomiting, change in bowel habits or leg pain or swelling.    Objective   There were no vitals taken for this visit.    Physical Exam  Vitals and nurse's notes reviewed  General: no acute distress  HEENT: Normal  Neck: Supple  Lungs: Clear  Cardio: RRR w/o murmur  Extremities: No edema, no calf tenderness  Neuro: Alert and oriented with no focal deficits    Assessment/Plan   Problem List Items Addressed This Visit             ICD-10-CM       Medium    Obstructive sleep apnea - Primary G47.33     Will try cutting back his pressure range to 4-10.  If this does not improve his symptoms he is going to try switching his facemask from a nasal pillow to a full facemask.  If this does not help he is to let me know at that time would consider referral to ENT.

## 2024-08-27 NOTE — ASSESSMENT & PLAN NOTE
Will try cutting back his pressure range to 4-10.  If this does not improve his symptoms he is going to try switching his facemask from a nasal pillow to a full facemask.  If this does not help he is to let me know at that time would consider referral to ENT.

## 2024-10-17 ENCOUNTER — TELEPHONE (OUTPATIENT)
Dept: PRIMARY CARE | Facility: CLINIC | Age: 54
End: 2024-10-17
Payer: COMMERCIAL

## 2024-10-17 DIAGNOSIS — R73.09 ABNORMAL SERUM GLUCOSE LEVEL: ICD-10-CM

## 2024-10-17 DIAGNOSIS — R53.82 CHRONIC FATIGUE: ICD-10-CM

## 2024-10-17 DIAGNOSIS — Z00.00 WELL ADULT EXAM: ICD-10-CM

## 2024-10-17 DIAGNOSIS — Z12.5 SPECIAL SCREENING FOR MALIGNANT NEOPLASM OF PROSTATE: ICD-10-CM

## 2024-10-22 ENCOUNTER — APPOINTMENT (OUTPATIENT)
Dept: PRIMARY CARE | Facility: CLINIC | Age: 54
End: 2024-10-22
Payer: COMMERCIAL

## 2025-02-24 ENCOUNTER — APPOINTMENT (OUTPATIENT)
Dept: PRIMARY CARE | Facility: CLINIC | Age: 55
End: 2025-02-24
Payer: COMMERCIAL

## 2025-02-24 VITALS
WEIGHT: 192 LBS | OXYGEN SATURATION: 95 % | TEMPERATURE: 98 F | BODY MASS INDEX: 31.23 KG/M2 | DIASTOLIC BLOOD PRESSURE: 74 MMHG | HEART RATE: 99 BPM | SYSTOLIC BLOOD PRESSURE: 134 MMHG

## 2025-02-24 DIAGNOSIS — R05.9 COUGH, UNSPECIFIED TYPE: Primary | ICD-10-CM

## 2025-02-24 DIAGNOSIS — D17.20 LIPOMA OF HIP: ICD-10-CM

## 2025-02-24 PROCEDURE — 1036F TOBACCO NON-USER: CPT | Performed by: FAMILY MEDICINE

## 2025-02-24 PROCEDURE — 99213 OFFICE O/P EST LOW 20 MIN: CPT | Performed by: FAMILY MEDICINE

## 2025-02-24 RX ORDER — ALBUTEROL SULFATE 90 UG/1
2 INHALANT RESPIRATORY (INHALATION) EVERY 6 HOURS PRN
Qty: 18 G | Refills: 1 | Status: SHIPPED | OUTPATIENT
Start: 2025-02-24

## 2025-02-24 NOTE — PROGRESS NOTES
Subjective   Patient ID: Portillo Barrios is a 55 y.o. male who presents for Cyst (Pt states they've had it for 25 years. Only in the last couple of years has it become painful when laying on it.).    HPI here with a bump on his right hip.  He has had it for more than 20 years.  He was told he had a cyst in the area.  He has noticed that in the last few years, since he has been gaining weight, that it now hurts him when he lays on his right side.  It is not swollen.  It is never broken open.    Review of Systems  Constitution: Patient is negative for fever, fatigue, weight change.  HEENT: Patient is never change in vision, hearing, swallow.  Cardio: Patient is negative for chest pain, lower extremity edema.  Pulmonary: Patient is negative for cough, shortness of breath.  Integument: Patient is positive for a lesion on his right hip.  Objective   /74 (BP Location: Left arm, Patient Position: Sitting)   Pulse 99   Temp 36.7 °C (98 °F) (Temporal)   Wt 87.1 kg (192 lb)   SpO2 95%   BMI 31.23 kg/m²     Physical Exam  General: Awake and alert no apparent distress.  HEENT: Moist oral mucosa no cervical lymphadenopathy.  Cardio: Heart S1-S2 no murmur rub or gallop.  Pulmonary: Lungs clear to auscultation bilaterally.  Skin: Patient with a freely movable apparent lipoma in the area of the greater trochanter.  Assessment/Plan   Problem List Items Addressed This Visit             ICD-10-CM    Cough - Primary chronic, intermittent.  Refill albuterol for as needed use. R05.9    Relevant Medications    albuterol (Proventil HFA) 90 mcg/actuation inhaler     Other Visit Diagnoses         Codes    Lipoma of hip    needs referral.  Patient will refer to a surgeon for further evaluation and management D17.20    Relevant Orders    Referral to Colorectal Surgery

## 2025-03-14 ENCOUNTER — HOSPITAL ENCOUNTER (OUTPATIENT)
Dept: RADIOLOGY | Facility: CLINIC | Age: 55
Discharge: HOME | End: 2025-03-14
Payer: COMMERCIAL

## 2025-03-14 ENCOUNTER — OFFICE VISIT (OUTPATIENT)
Dept: SURGERY | Facility: CLINIC | Age: 55
End: 2025-03-14
Payer: COMMERCIAL

## 2025-03-14 VITALS
DIASTOLIC BLOOD PRESSURE: 79 MMHG | SYSTOLIC BLOOD PRESSURE: 121 MMHG | WEIGHT: 189 LBS | HEART RATE: 86 BPM | BODY MASS INDEX: 30.37 KG/M2 | HEIGHT: 66 IN

## 2025-03-14 DIAGNOSIS — D17.20 LIPOMA OF HIP: Primary | ICD-10-CM

## 2025-03-14 DIAGNOSIS — D17.20 LIPOMA OF HIP: ICD-10-CM

## 2025-03-14 PROCEDURE — 76882 US LMTD JT/FCL EVL NVASC XTR: CPT

## 2025-03-14 PROCEDURE — 1036F TOBACCO NON-USER: CPT | Performed by: SURGERY

## 2025-03-14 PROCEDURE — 99203 OFFICE O/P NEW LOW 30 MIN: CPT | Performed by: SURGERY

## 2025-03-14 PROCEDURE — 3008F BODY MASS INDEX DOCD: CPT | Performed by: SURGERY

## 2025-03-14 PROCEDURE — 99213 OFFICE O/P EST LOW 20 MIN: CPT | Performed by: SURGERY

## 2025-03-14 ASSESSMENT — ENCOUNTER SYMPTOMS
LOSS OF SENSATION IN FEET: 0
OCCASIONAL FEELINGS OF UNSTEADINESS: 0
DEPRESSION: 0

## 2025-03-14 ASSESSMENT — PAIN SCALES - GENERAL: PAINLEVEL_OUTOF10: 4

## 2025-03-14 NOTE — LETTER
March 14, 2025     Juaquin Zhang MD  [address]  [facility]  [address]  [place] [address]    Patient: Portillo Barrios   YOB: 1970   Date of Visit: 3/14/2025       Dear Dr. Juaquin Zhang MD:    Thank you for referring Portillo Barrios to me for evaluation. Below are my notes for this consultation.  If you have questions, please do not hesitate to call me. I look forward to following your patient along with you.       Sincerely,     Rajesh Fritz MD      CC: No Recipients  ______________________________________________________________________________________    Subjective   Patient ID: Portillo Barrios is a 55 y.o. male who presents for Mass (Lipoma on rt hip).  HPI  Patient is a very pleasant 55-year-old gentleman who is referred for evaluation and treatment of a soft tissue mass  on his right lateral hip.  This has been slow growing for more than 20 years.  More recently has become symptomatic with pain and tenderness when he sleeps on his right side.  Denies any trauma to the area.    Review of Systems  On review of systems patient denies any weight loss, fever, change in bowel habits, melena, hematochezia, coronary artery disease, hypertension, TIA/CVA, bleeding, jaundice, pancreatitis, hepatitis.    Patient does not smoke.    PMH: Obstructive sleep apnea    PSH: Drainage of a perirectal abscess    Objective     Physical Exam  Well-nourished, well-developed. In no distress  Alert and oriented x 3  Lungs are clear to auscultation bilaterally.  Cardiac exam is regular rhythm and rate.  Abdomen is soft nontender nondistended.  Neurologic exam is without focal deficit.   In the right lateral hip there is a oval 4 x 3 cm subcutaneous mass.  Suspect large lipoma    Assessment/Plan   Diagnoses and all orders for this visit:  Lipoma of hip  -     Referral to Colorectal Surgery  -     US extremity nonvascular real time w image documentation limited anatomic specific; Future  -     Case  Request Operating Room: EXCISION, LESION, SKIN, TORSO; Standing  -     Request for Pre-Admission Testing Visit; Future  Other orders  -     Place in outpatient/hospital ambulatory surgery; Standing  -     Full code; Standing  -     NPO Diet Except: Sips with meds; Effective now; Standing  -     Height and weight; Standing  -     Insert and maintain peripheral IV; Standing  -     Saline lock IV; Standing  -     Type And Screen; Standing  -     Inpatient consult to Respiratory Care; Standing       Impression: right lateral hip  lipoma.  Plan to get ultrasound to further evaluate.  Placed him on the operating schedule for excisional biopsy.  We discussed the procedure to include risk benefits alternatives.  He agrees to the operation

## 2025-03-14 NOTE — PROGRESS NOTES
Subjective   Patient ID: Portillo Barrios is a 55 y.o. male who presents for Mass (Lipoma on rt hip).  HPI  Patient is a very pleasant 55-year-old gentleman who is referred for evaluation and treatment of a soft tissue mass  on his right lateral hip.  This has been slow growing for more than 20 years.  More recently has become symptomatic with pain and tenderness when he sleeps on his right side.  Denies any trauma to the area.    Review of Systems  On review of systems patient denies any weight loss, fever, change in bowel habits, melena, hematochezia, coronary artery disease, hypertension, TIA/CVA, bleeding, jaundice, pancreatitis, hepatitis.    Patient does not smoke.    PMH: Obstructive sleep apnea    PSH: Drainage of a perirectal abscess    Objective     Physical Exam  Well-nourished, well-developed. In no distress  Alert and oriented x 3  Lungs are clear to auscultation bilaterally.  Cardiac exam is regular rhythm and rate.  Abdomen is soft nontender nondistended.  Neurologic exam is without focal deficit.   In the right lateral hip there is a oval 4 x 3 cm subcutaneous mass.  Suspect large lipoma    Assessment/Plan   Diagnoses and all orders for this visit:  Lipoma of hip  -     Referral to Colorectal Surgery  -     US extremity nonvascular real time w image documentation limited anatomic specific; Future  -     Case Request Operating Room: EXCISION, LESION, SKIN, TORSO; Standing  -     Request for Pre-Admission Testing Visit; Future  Other orders  -     Place in outpatient/hospital ambulatory surgery; Standing  -     Full code; Standing  -     NPO Diet Except: Sips with meds; Effective now; Standing  -     Height and weight; Standing  -     Insert and maintain peripheral IV; Standing  -     Saline lock IV; Standing  -     Type And Screen; Standing  -     Inpatient consult to Respiratory Care; Standing       Impression: right lateral hip  lipoma.  Plan to get ultrasound to further evaluate.  Placed him on the  operating schedule for excisional biopsy.  We discussed the procedure to include risk benefits alternatives.  He agrees to the operation

## 2025-03-14 NOTE — PATIENT INSTRUCTIONS
To further evaluate the lump in your right hip  I do recommend getting an ultrasound.    Please call the number listed below to schedule the study    Give my  office a call once you've gotten the study so that we may discuss the results.

## 2025-03-18 PROBLEM — D17.20 LIPOMA OF HIP: Status: ACTIVE | Noted: 2025-03-14

## 2025-03-21 ENCOUNTER — APPOINTMENT (OUTPATIENT)
Dept: PRIMARY CARE | Facility: CLINIC | Age: 55
End: 2025-03-21
Payer: COMMERCIAL

## 2025-03-27 LAB — PSA SERPL-MCNC: 0.6 NG/ML

## 2025-03-28 LAB
ALBUMIN SERPL-MCNC: 4.4 G/DL (ref 3.6–5.1)
ALP SERPL-CCNC: 43 U/L (ref 35–144)
ALT SERPL-CCNC: 35 U/L (ref 9–46)
ANION GAP SERPL CALCULATED.4IONS-SCNC: 10 MMOL/L (CALC) (ref 7–17)
APPEARANCE UR: CLEAR
AST SERPL-CCNC: 30 U/L (ref 10–35)
BASOPHILS # BLD AUTO: 61 CELLS/UL (ref 0–200)
BASOPHILS NFR BLD AUTO: 0.9 %
BILIRUB SERPL-MCNC: 1.5 MG/DL (ref 0.2–1.2)
BILIRUB UR QL STRIP: NEGATIVE
BUN SERPL-MCNC: 15 MG/DL (ref 7–25)
CALCIUM SERPL-MCNC: 9.6 MG/DL (ref 8.6–10.3)
CHLORIDE SERPL-SCNC: 102 MMOL/L (ref 98–110)
CHOLEST SERPL-MCNC: 132 MG/DL
CHOLEST/HDLC SERPL: 3.2 (CALC)
CO2 SERPL-SCNC: 27 MMOL/L (ref 20–32)
COLOR UR: YELLOW
CREAT SERPL-MCNC: 1.28 MG/DL (ref 0.7–1.3)
EGFRCR SERPLBLD CKD-EPI 2021: 67 ML/MIN/1.73M2
EOSINOPHIL # BLD AUTO: 109 CELLS/UL (ref 15–500)
EOSINOPHIL NFR BLD AUTO: 1.6 %
ERYTHROCYTE [DISTWIDTH] IN BLOOD BY AUTOMATED COUNT: 13.1 % (ref 11–15)
EST. AVERAGE GLUCOSE BLD GHB EST-MCNC: 120 MG/DL
EST. AVERAGE GLUCOSE BLD GHB EST-SCNC: 6.6 MMOL/L
GLUCOSE SERPL-MCNC: 97 MG/DL (ref 65–99)
GLUCOSE UR QL STRIP: NEGATIVE
HBA1C MFR BLD: 5.8 % OF TOTAL HGB
HCT VFR BLD AUTO: 46.6 % (ref 38.5–50)
HDLC SERPL-MCNC: 41 MG/DL
HGB BLD-MCNC: 15.9 G/DL (ref 13.2–17.1)
HGB UR QL STRIP: NEGATIVE
KETONES UR QL STRIP: NEGATIVE
LDLC SERPL CALC-MCNC: 72 MG/DL (CALC)
LEUKOCYTE ESTERASE UR QL STRIP: NEGATIVE
LYMPHOCYTES # BLD AUTO: 1503 CELLS/UL (ref 850–3900)
LYMPHOCYTES NFR BLD AUTO: 22.1 %
MCH RBC QN AUTO: 31 PG (ref 27–33)
MCHC RBC AUTO-ENTMCNC: 34.1 G/DL (ref 32–36)
MCV RBC AUTO: 90.8 FL (ref 80–100)
MONOCYTES # BLD AUTO: 639 CELLS/UL (ref 200–950)
MONOCYTES NFR BLD AUTO: 9.4 %
NEUTROPHILS # BLD AUTO: 4488 CELLS/UL (ref 1500–7800)
NEUTROPHILS NFR BLD AUTO: 66 %
NITRITE UR QL STRIP: NEGATIVE
NONHDLC SERPL-MCNC: 91 MG/DL (CALC)
PH UR STRIP: 7 [PH] (ref 5–8)
PLATELET # BLD AUTO: 227 THOUSAND/UL (ref 140–400)
PMV BLD REES-ECKER: 10.2 FL (ref 7.5–12.5)
POTASSIUM SERPL-SCNC: 4.3 MMOL/L (ref 3.5–5.3)
PROT SERPL-MCNC: 6.6 G/DL (ref 6.1–8.1)
PROT UR QL STRIP: NEGATIVE
RBC # BLD AUTO: 5.13 MILLION/UL (ref 4.2–5.8)
SODIUM SERPL-SCNC: 139 MMOL/L (ref 135–146)
SP GR UR STRIP: 1.01 (ref 1–1.03)
TRIGL SERPL-MCNC: 100 MG/DL
WBC # BLD AUTO: 6.8 THOUSAND/UL (ref 3.8–10.8)

## 2025-04-04 ENCOUNTER — APPOINTMENT (OUTPATIENT)
Dept: OTOLARYNGOLOGY | Facility: CLINIC | Age: 55
End: 2025-04-04
Payer: COMMERCIAL

## 2025-04-04 VITALS — HEIGHT: 66 IN | BODY MASS INDEX: 30.37 KG/M2 | WEIGHT: 189 LBS | TEMPERATURE: 97.7 F

## 2025-04-04 DIAGNOSIS — G47.33 OBSTRUCTIVE SLEEP APNEA: ICD-10-CM

## 2025-04-04 DIAGNOSIS — G47.00 INSOMNIA, UNSPECIFIED TYPE: Primary | ICD-10-CM

## 2025-04-04 PROCEDURE — 1036F TOBACCO NON-USER: CPT

## 2025-04-04 PROCEDURE — 99202 OFFICE O/P NEW SF 15 MIN: CPT

## 2025-04-04 PROCEDURE — 3008F BODY MASS INDEX DOCD: CPT

## 2025-04-04 RX ORDER — NAPROXEN 500 MG/1
500 TABLET ORAL
COMMUNITY
Start: 2025-02-27

## 2025-04-04 RX ORDER — METHOCARBAMOL 750 MG/1
750 TABLET, FILM COATED ORAL 3 TIMES DAILY
COMMUNITY
Start: 2025-02-26

## 2025-04-04 RX ORDER — ESCITALOPRAM OXALATE 10 MG/1
10 TABLET ORAL DAILY
COMMUNITY

## 2025-04-04 NOTE — PROGRESS NOTES
Chief Complaint   Patient presents with    New Patient Visit     F/U SLEEP STUDY 6/24     HPI:  Portillo Barrios is a 55 y.o. male preference for consultation for Inspire procedure.  Reports he has being CPAP since June of last year has been having difficulties with pressure settings and he feels bloated after use.  Does report other sleep disturbances including trouble falling asleep and staying asleep.  Reports he usually sleeps for hours a night.    At home sleep study 3/22/2024 reveals AHI 3% 37.7, AHI 4% 28.5 and total apnea index 25.8     PMH:  Past Medical History:   Diagnosis Date    Sleep difficulties      History reviewed. No pertinent surgical history.      Medications:     Current Outpatient Medications:     albuterol (Proventil HFA) 90 mcg/actuation inhaler, Inhale 2 puffs every 6 hours if needed for shortness of breath., Disp: 18 g, Rfl: 1    escitalopram (Lexapro) 10 mg tablet, Take 1 tablet (10 mg) by mouth once daily., Disp: , Rfl:     lamoTRIgine (LaMICtal) 150 mg tablet, Take 1 tablet (150 mg) by mouth 2 times a day., Disp: , Rfl:     propranolol (Inderal) 10 mg tablet, Take 1 tablet (10 mg) by mouth 2 times a day. PRN, Disp: , Rfl:     sildenafil (Viagra) 50 mg tablet, Take 1 tablet (50 mg) by mouth once daily as needed for erectile dysfunction., Disp: 30 tablet, Rfl: 3    budesonide-formoteroL (Symbicort) 160-4.5 mcg/actuation inhaler, Inhale 2 puffs 2 times a day. Rinse mouth with water after use to reduce aftertaste and incidence of candidiasis. Do not swallow., Disp: 10.2 each, Rfl: 3    methocarbamol (Robaxin) 750 mg tablet, Take 1 tablet (750 mg) by mouth 3 times a day. (Patient not taking: Reported on 4/4/2025), Disp: , Rfl:     naproxen (Naprosyn) 500 mg tablet, Take 1 tablet (500 mg) by mouth 2 times daily (morning and late afternoon). (Patient not taking: Reported on 4/4/2025), Disp: , Rfl:      Allergies:  No Known Allergies     ROS:  Review of systems normal unless stated otherwise in  "the HPI and/or PMH.    Physical Exam:  Temperature 36.5 °C (97.7 °F), height 1.676 m (5' 6\"), weight 85.7 kg (189 lb). Body mass index is 30.51 kg/m².     GENERAL APPEARANCE: Well developed and well nourished.  Alert and oriented in no acute distress.  Normal vocal quality.      HEAD/FACE: No erythema or edema or facial tenderness.  Normal facial nerve function bilaterally.    EAR:       EXTERNAL: Normal pinnas and external auditory canals without lesion or obstructing wax.       MIDDLE EAR: Tympanic membranes intact and mobile with normal landmarks.  Middle ear space appears well aerated.       TUBE STATUS: N/A       MASTOID CAVITY: N/A       HEARING: Gross hearing assessment is within normal limits.      NOSE:       VISUALIZED USING: Anterior rhinoscopy with headlight and nasal speculum.       DORSUM: Midline, nontraumatic appearance.       MUCOSA: Normal-appearing.       SECRETIONS: Normal.       SEPTUM: Midline and nonobstructing.       INFERIOR TURBINATES: Normal.       MIDDLE TURBINATES/MEATUS: N/A       BLEEDING: N/A         ORAL CAVITY/PHARYNX:       TEETH: Adequate dentition.       TONGUE: No mass or lesion.  Normal mobility.       FLOOR OF MOUTH: No mass or lesion.       PALATE: Normal hard palate, soft palate, and uvula.       OROPHARYNX: Normal without mass or lesion.       BUCCAL MUCOSA/GBS: Normal without mass or lesion.       LIPS: Normal.    LARYNX/HYPOPHARYNX/NASOPHARYNX: N/A    NECK: No palpable masses or abnormal adenopathy.  Trachea is midline.    THYROID: No thyromegaly or palpable nodule.    SALIVARY GLANDS: Normal bilateral parotid and submandibular glands by inspection and palpation.    TMJ's: Normal.    NEURO: Cranial nerve exam grossly normal bilaterally.       Assessment/Plan   Portillo was seen today for new patient visit.  Diagnoses and all orders for this visit:  Insomnia, unspecified type (Primary)  Obstructive sleep apnea  -     Referral to ENT  -     Referral to Adult Sleep Medicine; " Future  BMI 30.0-30.9,adult    Discussed DENG and sequela including hypertension, cardiac arrhythmia, MI, stroke, dementia, and early death.  Reviewed sleep study and CPAP gold standard for treatment.  I did discuss inspire procedure and drug-induced sleep endoscopy with Manav.  He  would like to see sleep medicine to see if he can optimize his sleeping and any other suggestions they might provide.  He will call the nursing office if he decides he would like to pursue inspire.  No follow-ups on file.     Cristina Zhu, APRN-CNP

## 2025-04-07 ENCOUNTER — APPOINTMENT (OUTPATIENT)
Dept: PRIMARY CARE | Facility: CLINIC | Age: 55
End: 2025-04-07
Payer: COMMERCIAL

## 2025-04-07 VITALS
BODY MASS INDEX: 30.37 KG/M2 | OXYGEN SATURATION: 98 % | SYSTOLIC BLOOD PRESSURE: 162 MMHG | HEART RATE: 99 BPM | DIASTOLIC BLOOD PRESSURE: 102 MMHG | HEIGHT: 66 IN | WEIGHT: 189 LBS

## 2025-04-07 DIAGNOSIS — Z00.00 WELL ADULT EXAM: ICD-10-CM

## 2025-04-07 DIAGNOSIS — R25.1 TREMOR: ICD-10-CM

## 2025-04-07 DIAGNOSIS — N52.9 VASCULOGENIC ERECTILE DYSFUNCTION, UNSPECIFIED VASCULOGENIC ERECTILE DYSFUNCTION TYPE: ICD-10-CM

## 2025-04-07 DIAGNOSIS — F32.A DEPRESSION, UNSPECIFIED DEPRESSION TYPE: ICD-10-CM

## 2025-04-07 DIAGNOSIS — Z23 IMMUNIZATION DUE: Primary | ICD-10-CM

## 2025-04-07 DIAGNOSIS — D17.20 LIPOMA OF HIP: ICD-10-CM

## 2025-04-07 DIAGNOSIS — G47.33 OBSTRUCTIVE SLEEP APNEA: ICD-10-CM

## 2025-04-07 PROCEDURE — 90471 IMMUNIZATION ADMIN: CPT | Performed by: FAMILY MEDICINE

## 2025-04-07 PROCEDURE — 1036F TOBACCO NON-USER: CPT | Performed by: FAMILY MEDICINE

## 2025-04-07 PROCEDURE — 99396 PREV VISIT EST AGE 40-64: CPT | Performed by: FAMILY MEDICINE

## 2025-04-07 PROCEDURE — 90715 TDAP VACCINE 7 YRS/> IM: CPT | Performed by: FAMILY MEDICINE

## 2025-04-07 PROCEDURE — 3008F BODY MASS INDEX DOCD: CPT | Performed by: FAMILY MEDICINE

## 2025-04-07 RX ORDER — SILDENAFIL 50 MG/1
50 TABLET, FILM COATED ORAL DAILY PRN
Qty: 30 TABLET | Refills: 3 | Status: SHIPPED | OUTPATIENT
Start: 2025-04-07 | End: 2026-04-07

## 2025-04-07 ASSESSMENT — COLUMBIA-SUICIDE SEVERITY RATING SCALE - C-SSRS: 1. IN THE PAST MONTH, HAVE YOU WISHED YOU WERE DEAD OR WISHED YOU COULD GO TO SLEEP AND NOT WAKE UP?: NO

## 2025-04-07 ASSESSMENT — PAIN SCALES - GENERAL: PAINLEVEL_OUTOF10: 0-NO PAIN

## 2025-04-07 NOTE — PROGRESS NOTES
Subjective   Patient ID: Portillo Barrios is a 55 y.o. male who presents for Annual Exam (Patient is in office today for physical . /Possible strain in left arm for 2 weeks . No pain just some numbness /-EKG 3/20/2024/-labs  3/27/2025/-prev 20 and TDAP vax due ).    HPI   Here for complete physical exam.  Patient has a history of weather induced shortness of breath . He uses albuterol MDI as needed . He uses less than 2 MDI per year .    Patient sees a specialist for mood variability. He is currently on lamotrigine.  He is also on escitalopram 10 mg daily.  He sees a specialist.  Patient has sleep apnea.  He is much improved since he has been using CPAP.  Patient has ED.  He gets good results with sildenafil 50 mg.  Patient has an occasional tremor.  He uses propranolol 10 mg twice daily.  Patient had a tetanus shot in 2014. He completed his shingles series in 2024.  He has not been immunized against pneumococcal disease. He has been immunized against coronavirus x2. He has been immunized against influenza in 10/22.  Patient had a colonoscopy in 2022 with a reported 10-year follow-up..   Patient has never used tobacco and does not use alcohol.     Review of Systems  Constitutional Symptoms: He is positive for weight loss. Is positive for fatigue.He is negative for fever, night sweats, weight loss, Weight loss due to diet, weight gain due to diet, unexpected weight gain, loss of appetite, increased appetite, headaches, abnormal activity level, Sleep Disturbance, Recent Illness.   Eyes: He is negative for blindness, blind spots, loss and blurring of vision, double vision, swelling, redness, pruritus, eye pain, discharge, dryness of eyes.   Ear, Nose, Mouth, Throat: He is negative for hearing loss, wearing hearing aids, tinnitus, ear pain, ear drainage, dizziness, allergies, nasal congestion, rhinorrhea, nasal obstruction, post nasal drip, nose bleeds, teeth problems, wearing dentures, mouth sores, gum disease,  dysphagia, hoarseness, sore throat, tinnitus, sleep apnea.   Cardiovascular: He is negative for chest pain/pressure, radiation of pain, palpitations, shortness of breath, dyspnea on exertion, orthopnea, syncope, diaphoresis, cyanosis, edema.   Respiratory: He is positive for shortness of breath due to asthma.He is negative for shortness of breath, dyspnea on exertion, coughing, sputum, hemoptysis, wheezing, snoring.   Breast: He is negative for masses, nipple discharge, gynecomastia.   Gastrointestinal: He is negative for anorexia, indigestion, increased belching, food intolerance, use of antacids, nausea, hematemesis, jaundice, abdominal pain, change in bowel habits, diarrhea, constipation, abnormal stools, hematochezia, melena, blood in stool, increased flatus, hemorrhoids.   Male Genitourinary: He is negative for erectile dysfunction, frequency, nocturia, hesitancy, dribbling, Incontinence, dysuria, hematuria, Swelling of penis, testicular pain or swelling, Hematospermia, urethral discharge.   Musculoskeletal: He is negative for joint pain, joint swelling, joint warmth, joint redness, myalgias, cramps, nocturnal muscle cramps, weakness, stiffness, limitation of motion.   Integumentary: He is positive for a painless lump near the right greater trochanter.  He is negative for change in mole, skin trouble or rash, itching, dryness, loss of hair, hirsutism.   Neurological: He is negative for headache, speech difficulty, numbness, tingling, weakness, paralysis, tremors, dizziness, syncope, balance problems, memory loss.   Psychiatric: Is positive for mood variability. He is negative for depression, moodiness, anhedonia, change in sleep pattern, disturbing thoughts or feelings, change in libido, suicidal thoughts or attempts, anxiety, panic attacks, obsessive thoughts, compulsions, hyperactivity.   Endocrine: He is negative for weight gain, heat or cold intolerance, excessive sweating, polydipsia.  "  Hematologic/Lymphatic: He is negative for bruising, abnormal bleeding, bleeding gums, nose bleeds, swollen glands.     Objective   BP (!) 162/102   Pulse 99   Ht 1.676 m (5' 6\")   Wt 85.7 kg (189 lb)   SpO2 98%   BMI 30.51 kg/m²     Physical Exam  General Appearance: Pt is in no acute distress. He is well nourished, well developed. The patient is awake and alert and appears stated age.  Head: Pt's hair pattern is normal for patients age and The scalp is normal . The skull is normocephalic, atraumatic. The face is unremarkable with no facial droop. Palpation of the head reveals no tenderness or masses.  Eyes: PERRLA, EOMI, no scleral icterus. Normal position and alignment. Eyebrows are normal.  Ears, Nose, Mouth, Throat:   EARS: External bilateral ears reveal normal helix, tragus and ear lobe. Both canals are normal. Tympanic membranes are pearly gray, normal landmarks, good light reflex.   MOUTH: Lips are normal with no lesion. Oral mucosa is moist. Hard and soft palates are normal. Teeth are in good repair. Tongue reveals is normal. Tonsils are present with no lesions. Uvula is normal. Posterior pharynx without lesions.  Neck: Inspection of the neck reveals no masses or JVD.   Inspection reveals normal thyroid gland. Palpation shows normal thyroid gland. with No carotid bruit present.   Anterior cervical lymph node chains are unremarkable. Posterior chains are unremarkable.  Chest: Chest is symmetric. Lungs are clear to auscultation and percussion, no respiratory distress. Breathing is normal.  Cardiovascular: Heart is RRR, normal S1, S2. No murmurs, rubs or gallops. PMI is normal in left 6th IC space midclavicular line. Femoral pulses are present and without bruits. DP pulses are present. Extremities: no edema, clubbing, cyanosis, or varicosities.  Breast: INSPECTION: Size and symmetry: Breasts are normal and symmetric .  Abdomen: Abdomen is soft, NT, ND. BS + 4 quadrants. No organomegaly or masses.. "   Genitourinary: Genital exam: Penis is not circumcised without lesion or discharge.   Lymph Nodes: Palpation of the cervical area are within normal limit. Palpation of the axillary area are within normal limit. Palpation of the inguinal area are within normal limit.  Musculoskeletal: Gait is normal. Extremities: Full Range of motion and strength throughout.  Skin: Skin has no bruising, rash, eruptions, or abnormal appearing lesions. Has dark tag at base of neck, posteriorly.  Neurological: Intact and non-focal. Cranial nerves II - XII are grossly intact. Motor exams reveals normal tone and strength , Sensation: normal to touch, position and vibration. DTR: are +2/4 and symmetric at the AJ and KJ and no Babinski.  Psychiatric: Proper orientation to person, place and time. Recent memory is intact. Remote memory is intact. Patient's mood is normal with good eye contact. Affect is appropriate.   Assessment/Plan   Problem List Items Addressed This Visit             ICD-10-CM    Tremor chronic.  Continue on propranolol 10 mg twice daily. R25.1    Obstructive sleep apnea stable.  Continue on CPAP. G47.33    Lipoma of hip needs removal.  Patient is scheduled for surgery by surgeon for removal. D17.20     Other Visit Diagnoses         Codes    Immunization due    -  Primary needs better control.  Patient received tetanus shot today. Z23    Relevant Orders    Tdap vaccine, age 7 years and older  (BOOSTRIX) (Completed)    Vasculogenic erectile dysfunction, unspecified vasculogenic erectile dysfunction type    chronic.  Refill sildenafil for as needed use. N52.9    Relevant Medications    sildenafil (Viagra) 50 mg tablet    Well adult exam    normal exam. Z00.00    Depression, unspecified depression type    stable.  Management by specialist.  Continue on escitalopram, lamotrigine. F32.A

## 2025-04-25 DIAGNOSIS — R05.9 COUGH, UNSPECIFIED TYPE: ICD-10-CM

## 2025-04-25 RX ORDER — ALBUTEROL SULFATE 90 UG/1
2 INHALANT RESPIRATORY (INHALATION) EVERY 6 HOURS PRN
Qty: 18 G | Refills: 1 | Status: SHIPPED | OUTPATIENT
Start: 2025-04-25

## 2025-05-01 ENCOUNTER — TELEPHONE (OUTPATIENT)
Dept: PREADMISSION TESTING | Facility: HOSPITAL | Age: 55
End: 2025-05-01

## 2025-05-01 ENCOUNTER — CLINICAL SUPPORT (OUTPATIENT)
Dept: PREADMISSION TESTING | Facility: HOSPITAL | Age: 55
End: 2025-05-01
Payer: COMMERCIAL

## 2025-05-01 DIAGNOSIS — D17.20 LIPOMA OF HIP: ICD-10-CM

## 2025-05-13 ENCOUNTER — LAB (OUTPATIENT)
Dept: LAB | Facility: HOSPITAL | Age: 55
End: 2025-05-13
Payer: COMMERCIAL

## 2025-05-13 ENCOUNTER — HOSPITAL ENCOUNTER (OUTPATIENT)
Dept: CARDIOLOGY | Facility: HOSPITAL | Age: 55
Discharge: HOME | End: 2025-05-13
Payer: COMMERCIAL

## 2025-05-13 ENCOUNTER — PRE-ADMISSION TESTING (OUTPATIENT)
Dept: PREADMISSION TESTING | Facility: HOSPITAL | Age: 55
End: 2025-05-13
Payer: COMMERCIAL

## 2025-05-13 VITALS
DIASTOLIC BLOOD PRESSURE: 85 MMHG | HEART RATE: 83 BPM | TEMPERATURE: 98.8 F | SYSTOLIC BLOOD PRESSURE: 142 MMHG | WEIGHT: 193.56 LBS | OXYGEN SATURATION: 96 % | HEIGHT: 66 IN | RESPIRATION RATE: 16 BRPM | BODY MASS INDEX: 31.11 KG/M2

## 2025-05-13 DIAGNOSIS — G47.33 OBSTRUCTIVE SLEEP APNEA: ICD-10-CM

## 2025-05-13 DIAGNOSIS — Z01.818 ENCOUNTER FOR OTHER PREPROCEDURAL EXAMINATION: Primary | ICD-10-CM

## 2025-05-13 DIAGNOSIS — Z01.818 PREOP TESTING: ICD-10-CM

## 2025-05-13 DIAGNOSIS — G47.33 OBSTRUCTIVE SLEEP APNEA (ADULT) (PEDIATRIC): ICD-10-CM

## 2025-05-13 DIAGNOSIS — G47.33 OBSTRUCTIVE SLEEP APNEA: Primary | ICD-10-CM

## 2025-05-13 LAB
ALBUMIN SERPL BCP-MCNC: 4.4 G/DL (ref 3.4–5)
ALP SERPL-CCNC: 44 U/L (ref 33–120)
ALT SERPL W P-5'-P-CCNC: 39 U/L (ref 10–52)
ANION GAP SERPL CALC-SCNC: 11 MMOL/L (ref 10–20)
AST SERPL W P-5'-P-CCNC: 38 U/L (ref 9–39)
BASOPHILS # BLD AUTO: 0.08 X10*3/UL (ref 0–0.1)
BASOPHILS NFR BLD AUTO: 0.8 %
BILIRUB SERPL-MCNC: 1.3 MG/DL (ref 0–1.2)
BUN SERPL-MCNC: 16 MG/DL (ref 6–23)
CALCIUM SERPL-MCNC: 9.4 MG/DL (ref 8.6–10.3)
CHLORIDE SERPL-SCNC: 103 MMOL/L (ref 98–107)
CO2 SERPL-SCNC: 28 MMOL/L (ref 21–32)
CREAT SERPL-MCNC: 1.52 MG/DL (ref 0.5–1.3)
EGFRCR SERPLBLD CKD-EPI 2021: 54 ML/MIN/1.73M*2
EOSINOPHIL # BLD AUTO: 0.14 X10*3/UL (ref 0–0.7)
EOSINOPHIL NFR BLD AUTO: 1.4 %
ERYTHROCYTE [DISTWIDTH] IN BLOOD BY AUTOMATED COUNT: 13.3 % (ref 11.5–14.5)
GLUCOSE SERPL-MCNC: 88 MG/DL (ref 74–99)
HCT VFR BLD AUTO: 47.6 % (ref 41–52)
HGB BLD-MCNC: 16 G/DL (ref 13.5–17.5)
IMM GRANULOCYTES # BLD AUTO: 0.04 X10*3/UL (ref 0–0.7)
IMM GRANULOCYTES NFR BLD AUTO: 0.4 % (ref 0–0.9)
LYMPHOCYTES # BLD AUTO: 2.03 X10*3/UL (ref 1.2–4.8)
LYMPHOCYTES NFR BLD AUTO: 20.9 %
MCH RBC QN AUTO: 29.9 PG (ref 26–34)
MCHC RBC AUTO-ENTMCNC: 33.6 G/DL (ref 32–36)
MCV RBC AUTO: 89 FL (ref 80–100)
MONOCYTES # BLD AUTO: 1.08 X10*3/UL (ref 0.1–1)
MONOCYTES NFR BLD AUTO: 11.1 %
NEUTROPHILS # BLD AUTO: 6.35 X10*3/UL (ref 1.2–7.7)
NEUTROPHILS NFR BLD AUTO: 65.4 %
NRBC BLD-RTO: 0 /100 WBCS (ref 0–0)
PLATELET # BLD AUTO: 241 X10*3/UL (ref 150–450)
POTASSIUM SERPL-SCNC: 4 MMOL/L (ref 3.5–5.3)
PROT SERPL-MCNC: 6.6 G/DL (ref 6.4–8.2)
RBC # BLD AUTO: 5.36 X10*6/UL (ref 4.5–5.9)
SODIUM SERPL-SCNC: 138 MMOL/L (ref 136–145)
WBC # BLD AUTO: 9.7 X10*3/UL (ref 4.4–11.3)

## 2025-05-13 PROCEDURE — 99204 OFFICE O/P NEW MOD 45 MIN: CPT

## 2025-05-13 PROCEDURE — 85025 COMPLETE CBC W/AUTO DIFF WBC: CPT

## 2025-05-13 PROCEDURE — 93010 ELECTROCARDIOGRAM REPORT: CPT | Performed by: INTERNAL MEDICINE

## 2025-05-13 PROCEDURE — 80053 COMPREHEN METABOLIC PANEL: CPT

## 2025-05-13 PROCEDURE — 36415 COLL VENOUS BLD VENIPUNCTURE: CPT

## 2025-05-13 RX ORDER — BISMUTH SUBSALICYLATE 262 MG
1 TABLET,CHEWABLE ORAL DAILY
COMMUNITY

## 2025-05-13 ASSESSMENT — ENCOUNTER SYMPTOMS
EYES NEGATIVE: 1
NECK NEGATIVE: 1
GASTROINTESTINAL NEGATIVE: 1
CONSTITUTIONAL NEGATIVE: 1
CARDIOVASCULAR NEGATIVE: 1
ENDOCRINE NEGATIVE: 1
RESPIRATORY NEGATIVE: 1

## 2025-05-13 NOTE — H&P (VIEW-ONLY)
[facility]/PAT Evaluation       Name: Portillo Barrios (Portillo FABIÁN Barrios)  /Age: 1970/55 y.o.     In-Person       Chief Complaint: lipoma    HPI      Date of Consult: 25    Referring Provider:  Dr. Rajesh Fritz    Date, Surgery, and Length:  25, Right Torso Lesion Excision, 60 minutes      Patient presents to [facility] for perioperative risk assessment prior to scheduled surgery. Pt with a lipoma on his right hip, has been present x20 years, recently has become painful and tender.        This note was created in part upon personal review of patient's medical records.        Pt denies any past history of anesthetic complications such as PONV, awareness, prolonged sedation, dental damage, aspiration, cardiac arrest, difficult intubation, difficult I.V. access or unexpected hospital admissions. No history of malignant hyperthermia and or pseudocholinesterase deficiency.    No history of blood transfusions.    The patient IS NOT a Church and will accept blood and blood products if medically indicated.     Type and screen WAS NOT sent.    Past Medical History:   Diagnosis Date    Sleep difficulties        No past surgical history on file.      Family History   Problem Relation Name Age of Onset    Diabetes Mother      Tremor Mother      Other (car accident) Father          age 43    Tremor Sister       Social History     Tobacco Use   Smoking Status Never    Passive exposure: Never   Smokeless Tobacco Never     Social History     Substance and Sexual Activity   Alcohol Use Not Currently     Social History     Substance and Sexual Activity   Drug Use Never       No Known Allergies    Current Outpatient Medications   Medication Instructions    albuterol 90 mcg/actuation inhaler 2 puffs, inhalation, Every 6 hours PRN    escitalopram (LEXAPRO) 10 mg, Daily    lamoTRIgine (LAMICTAL) 150 mg, 2 times daily    propranolol (INDERAL) 10 mg, 2 times daily    sildenafil (VIAGRA) 50 mg, oral, Daily PRN        PAT  "ROS:   Constitutional:   neg    Neuro/Psych:    Paresthesias left forearm (int, pinched nerve from lifting)  Eyes:   neg    Ears:   Nose:   neg    Mouth:   neg    Throat:   neg    Neck:   neg    Cardio:   neg    Respiratory:   neg    Endocrine:   neg    GI:   neg    :   neg    Musculoskeletal:    Right hip tenderness from lipoma  Hematologic:   neg    Skin:  neg        Physical Exam  Vitals reviewed.   Constitutional:       Appearance: Normal appearance. He is obese.   Cardiovascular:      Rate and Rhythm: Normal rate and regular rhythm.      Comments: No murmurs, rubs or gallops  Pulmonary:      Effort: Pulmonary effort is normal.      Breath sounds: Normal breath sounds.   Musculoskeletal:      Cervical back: Normal range of motion.   Neurological:      Mental Status: He is alert.          PAT AIRWAY:   Airway:     Mallampati::  III    Neck ROM::  Full  normal        Visit Vitals  /85   Pulse 83   Temp 37.1 °C (98.8 °F) (Temporal)   Resp 16   Ht 1.67 m (5' 5.75\")   Wt 87.8 kg (193 lb 9 oz)   SpO2 96%   BMI 31.48 kg/m²   Smoking Status Never   BSA 2.02 m²         Patient is a 55 y.o.  male scheduled for Right Torso Lesion Excision with Dr. Fritz on 5/20/25.      Plan      Neuro:  No neurologic diagnosis or significant findings on chart review, clinical presentation and evaluation.  No grossly apparent neurologic perioperative risk.  Patient is not at increased risk for perioperative CVA      Tremor- controlled on Propranolol (cont through periop period).    Cardiovascular:    RCRI: 0 Risk of Mace: 0.4%    Caprini: 3    Patient denies any chest pain, tightness, heaviness, pressure, radiating pain, palpitations, irregular heartbeats, lightheadedness, cough, congestion, shortness of breath, MILLER, PND, near syncope, weight loss or gain.    Good functional capacity (>4 METS).      EKG in PAT NSR, left axis deviation, rate 79 bpm, QT/Qtc 378/433    Encounter Date: 05/13/25   ECG 12 Lead   Result Value    " Ventricular Rate 79    Atrial Rate 79    UT Interval 168    QRS Duration 80    QT Interval 378    QTC Calculation(Bazett) 433    P Axis 52    R Axis -35    T Axis 8    QRS Count 13    Q Onset 215    P Onset 131    P Offset 177    T Offset 404    QTC Fredericia 414    Narrative    Normal sinus rhythm  Left axis deviation  Abnormal ECG  No previous ECGs available  Confirmed by Juliocesar Iyer ([id number]) on 5/14/2025 10:01:24 AM         Pulmonary:  No pulmonary diagnosis or significant findings on chart review or clinical presentation.  No further preoperative testing is indicated at this time.  Stop Bang- diagnosed with DENG, uses CPAP.  ARISCAT: <26 points, 1.6% risk of in-hospital postoperative pulmonary complication  PRODIGY: High risk for opioid induced respiratory depression  Pumonary toilet education discussed, patient also provided deep breathing exercises and incentive spirometry educational handout    Weather-induced SOB- rarely using Albuterol inhaler.    Renal/endo:  Predm- last A1C 5.8 3/2025, diet controlled.    Lab Results   Component Value Date    HGBA1C 5.8 (H) 03/27/2025       Heme:  Patient instructed to ambulate as soon as possible postoperatively to decrease thromboembolic risk.    Initiate mechanical DVT prophylaxis as soon as possible and initiate chemical prophylaxis when deemed safe from a bleeding standpoint post surgery.        Risk assessment complete.  This patient is LOW risk candidate undergoing LOW risk procedure, patient is medically optimized for surgery.        Labs/testing obtained in [facility] on 5/13/25: CBC, CMP, EKG    Lab Results   Component Value Date    WBC 9.7 05/13/2025    HGB 16.0 05/13/2025    HCT 47.6 05/13/2025    MCV 89 05/13/2025     05/13/2025     Lab Results   Component Value Date    GLUCOSE 88 05/13/2025    CALCIUM 9.4 05/13/2025     05/13/2025    K 4.0 05/13/2025    CO2 28 05/13/2025     05/13/2025    BUN 16 05/13/2025    CREATININE 1.52 (H) 05/13/2025     Lab  Results   Component Value Date    ALT 39 05/13/2025    AST 38 05/13/2025    ALKPHOS 44 05/13/2025    BILITOT 1.3 (H) 05/13/2025       Follow up/communication: None      Preoperative medication instructions were provided and reviewed with the patient.  Any additional testing or evaluation was explained to the patient.  Nothing by mouth instructions were discussed and patient's questions were answered prior to conclusion to this encounter.  Patient verbalized understanding of preoperative instructions given in preadmission testing; discharge instructions available in EMR.    This note was dictated with speech recognition.  Minor errors may have been detected during use of speech recognition.

## 2025-05-13 NOTE — PREPROCEDURE INSTRUCTIONS
Medication List            Accurate as of May 13, 2025  3:31 PM. Always use your most recent med list.                albuterol 90 mcg/actuation inhaler  INHALE 2 PUFFS EVERY 6 HOURS IF NEEDED FOR SHORTNESS OF BREATH.  Medication Adjustments for Surgery: Take/Use as prescribed     escitalopram 10 mg tablet  Commonly known as: Lexapro  Additional Medication Adjustments for Surgery: Other (Comment)  Notes to patient: Pt reports he is no longer taking.     LaMICtal 150 mg tablet  Generic drug: lamoTRIgine  Medication Adjustments for Surgery: Take/Use as prescribed     magnesium glycinate 118 mg magnesium capsule  Medication Adjustments for Surgery: Do Not take on the morning of surgery     multivitamin tablet  Additional Medication Adjustments for Surgery: Take last dose 7 days before surgery  Notes to patient: Last dose: 5/12/25, do not take any more prior to surgery     propranolol 10 mg tablet  Commonly known as: Inderal  Medication Adjustments for Surgery: Take/Use as prescribed     sildenafil 50 mg tablet  Commonly known as: Viagra  Take 1 tablet (50 mg) by mouth once daily as needed for erectile dysfunction.  Medication Adjustments for Surgery: Take last dose 3 days before surgery  Additional Medication Adjustments for Surgery: Other (Comment)  Notes to patient: Last dose: 5/16/25, pt reports he is not taking.                          Preoperative Deep Breathing Exercises  Why it is important to do deep breathing exercises before my surgery?  Deep breathing exercises strengthen your breathing muscles.  This helps you to recover after your surgery and decreases the chance of breathing complications.  How are the deep breathing exercises done?  Sit straight with your back supported.  Breathe in deeply and slowly through your nose. Your lower rib cage should expand and your abdomen may move forward.  Hold that breath for 3 to 5 seconds.  Breathe out through pursed lips, slowly and completely.  Rest and repeat 10  times every hour while awake.  Rest longer if you become dizzy or lightheaded.        CONTACT SURGEON'S OFFICE IF YOU DEVELOP:  * Fever = 100.4 F   * New respiratory symptoms (e.g. cough, shortness of breath, respiratory distress, sore throat)  * Recent loss of taste or smell  *Flu like symptoms such as headache, fatigue or gastrointestinal symptoms  * You develop any open sores, shingles, burning or painful urination   AND/OR:  * You no longer wish to have the surgery.  * Any other personal circumstances change that may lead to the need to cancel or defer this surgery.  *You were admitted to any hospital within one week of your planned procedure.    SMOKING:  *Quitting smoking can make a huge difference to your health and recovery from surgery.    *If you need help with quitting, call 6-934-QUIT-NOW.    THE DAY OF SURGERY:  *Do not eat any food after midnight the night before your surgery.   *YOU MUST drink 14 OUNCES of clear liquids TWO hours before your instructed ARRIVAL TIME to the hospital. This includes water, black tea/coffee (no milk or cream), apple juice, clear broth and electrolyte drinks (Gatorade).  Please avoid clear liquids that are red in color.   *You may chew gum/mints up to TWO hours before your surgery/procedure.    SURGICAL TIME:  *You will be contacted between 2 p.m. and 6 p.m. the business day before your surgery with your arrival time.  *If you haven't received a call by 6pm, call [phone].  *Scheduled surgery times may change and you will be notified if this occurs-check your personal voicemail for any updates.    ON THE MORNING OF SURGERY:  *Wear comfortable, loose fitting clothing.   *Do not use moisturizers, creams, lotions or perfume.  *All jewelry and valuables should be left at home.  *Prosthetic devices such as contact lenses, hearing aids, dentures, eyelash extensions, hairpins and body piercing must be removed before surgery.    BRING WITH YOU:  *Photo ID and insurance  card  *Current list of medications and allergies  *Pacemaker/Defibrillator/Heart stent cards  *CPAP machine and mask  *Slings/splints/crutches  *Copy of your complete Advanced Directive/DHPOA-if applicable  *Neurostimulator implant remote    PARKING AND ARRIVAL:  *Check in at the Main Entrance desk and let them know you are here for surgery.  *You will be directed to the 2nd floor surgical waiting area.    IF YOU ARE HAVING OUTPATIENT/SAME DAY SURGERY:  *A responsible adult MUST accompany you at the time of discharge and stay with you for 24 hours after your surgery.  *You may NOT drive yourself home after surgery.  *You may use a taxi or ride sharing service ([employer], [employer]) to return home ONLY if you are accompanied by a friend or family member.  *Instructions for resuming your medications will be provided by your surgeon.

## 2025-05-13 NOTE — CPM/PAT H&P
[facility]/PAT Evaluation       Name: Portillo Barrios (Portillo FABIÁN Barrios)  /Age: 1970/55 y.o.     In-Person       Chief Complaint: lipoma    HPI      Date of Consult: 25    Referring Provider:  Dr. Rajesh Fritz    Date, Surgery, and Length:  25, Right Torso Lesion Excision, 60 minutes      Patient presents to [facility] for perioperative risk assessment prior to scheduled surgery. Pt with a lipoma on his right hip, has been present x20 years, recently has become painful and tender.        This note was created in part upon personal review of patient's medical records.        Pt denies any past history of anesthetic complications such as PONV, awareness, prolonged sedation, dental damage, aspiration, cardiac arrest, difficult intubation, difficult I.V. access or unexpected hospital admissions. No history of malignant hyperthermia and or pseudocholinesterase deficiency.    No history of blood transfusions.    The patient IS NOT a [employer] and will accept blood and blood products if medically indicated.     Type and screen WAS NOT sent.    Past Medical History:   Diagnosis Date    Sleep difficulties        No past surgical history on file.      Family History   Problem Relation Name Age of Onset    Diabetes Mother      Tremor Mother      Other (car accident) Father          age 43    Tremor Sister       Social History     Tobacco Use   Smoking Status Never    Passive exposure: Never   Smokeless Tobacco Never     Social History     Substance and Sexual Activity   Alcohol Use Not Currently     Social History     Substance and Sexual Activity   Drug Use Never       No Known Allergies    Current Outpatient Medications   Medication Instructions    albuterol 90 mcg/actuation inhaler 2 puffs, inhalation, Every 6 hours PRN    escitalopram (LEXAPRO) 10 mg, Daily    lamoTRIgine (LAMICTAL) 150 mg, 2 times daily    propranolol (INDERAL) 10 mg, 2 times daily    sildenafil (VIAGRA) 50 mg, oral, Daily PRN        PAT  "ROS:   Constitutional:   neg    Neuro/Psych:    Paresthesias left forearm (int, pinched nerve from lifting)  Eyes:   neg    Ears:   Nose:   neg    Mouth:   neg    Throat:   neg    Neck:   neg    Cardio:   neg    Respiratory:   neg    Endocrine:   neg    GI:   neg    :   neg    Musculoskeletal:    Right hip tenderness from lipoma  Hematologic:   neg    Skin:  neg        Physical Exam  Vitals reviewed.   Constitutional:       Appearance: Normal appearance. He is obese.   Cardiovascular:      Rate and Rhythm: Normal rate and regular rhythm.      Comments: No murmurs, rubs or gallops  Pulmonary:      Effort: Pulmonary effort is normal.      Breath sounds: Normal breath sounds.   Musculoskeletal:      Cervical back: Normal range of motion.   Neurological:      Mental Status: He is alert.          PAT AIRWAY:   Airway:     Mallampati::  III    Neck ROM::  Full  normal        Visit Vitals  /85   Pulse 83   Temp 37.1 °C (98.8 °F) (Temporal)   Resp 16   Ht 1.67 m (5' 5.75\")   Wt 87.8 kg (193 lb 9 oz)   SpO2 96%   BMI 31.48 kg/m²   Smoking Status Never   BSA 2.02 m²         Patient is a 55 y.o.  male scheduled for Right Torso Lesion Excision with Dr. Fritz on 5/20/25.      Plan      Neuro:  No neurologic diagnosis or significant findings on chart review, clinical presentation and evaluation.  No grossly apparent neurologic perioperative risk.  Patient is not at increased risk for perioperative CVA      Tremor- controlled on Propranolol (cont through periop period).    Cardiovascular:    RCRI: 0 Risk of Mace: 0.4%    Caprini: 3    Patient denies any chest pain, tightness, heaviness, pressure, radiating pain, palpitations, irregular heartbeats, lightheadedness, cough, congestion, shortness of breath, MILLER, PND, near syncope, weight loss or gain.    Good functional capacity (>4 METS).      EKG in PAT NSR, left axis deviation, rate 79 bpm, QT/Qtc 378/433    Encounter Date: 05/13/25   ECG 12 Lead   Result Value    " Ventricular Rate 79    Atrial Rate 79    LA Interval 168    QRS Duration 80    QT Interval 378    QTC Calculation(Bazett) 433    P Axis 52    R Axis -35    T Axis 8    QRS Count 13    Q Onset 215    P Onset 131    P Offset 177    T Offset 404    QTC Fredericia 414    Narrative    Normal sinus rhythm  Left axis deviation  Abnormal ECG  No previous ECGs available  Confirmed by Juliocesar Iyer ([id number]) on 5/14/2025 10:01:24 AM         Pulmonary:  No pulmonary diagnosis or significant findings on chart review or clinical presentation.  No further preoperative testing is indicated at this time.  Stop Bang- diagnosed with DENG, uses CPAP.  ARISCAT: <26 points, 1.6% risk of in-hospital postoperative pulmonary complication  PRODIGY: High risk for opioid induced respiratory depression  Pumonary toilet education discussed, patient also provided deep breathing exercises and incentive spirometry educational handout    Weather-induced SOB- rarely using Albuterol inhaler.    Renal/endo:  Predm- last A1C 5.8 3/2025, diet controlled.    Lab Results   Component Value Date    HGBA1C 5.8 (H) 03/27/2025       Heme:  Patient instructed to ambulate as soon as possible postoperatively to decrease thromboembolic risk.    Initiate mechanical DVT prophylaxis as soon as possible and initiate chemical prophylaxis when deemed safe from a bleeding standpoint post surgery.        Risk assessment complete.  This patient is LOW risk candidate undergoing LOW risk procedure, patient is medically optimized for surgery.        Labs/testing obtained in [facility] on 5/13/25: CBC, CMP, EKG    Lab Results   Component Value Date    WBC 9.7 05/13/2025    HGB 16.0 05/13/2025    HCT 47.6 05/13/2025    MCV 89 05/13/2025     05/13/2025     Lab Results   Component Value Date    GLUCOSE 88 05/13/2025    CALCIUM 9.4 05/13/2025     05/13/2025    K 4.0 05/13/2025    CO2 28 05/13/2025     05/13/2025    BUN 16 05/13/2025    CREATININE 1.52 (H) 05/13/2025     Lab  Results   Component Value Date    ALT 39 05/13/2025    AST 38 05/13/2025    ALKPHOS 44 05/13/2025    BILITOT 1.3 (H) 05/13/2025       Follow up/communication: None      Preoperative medication instructions were provided and reviewed with the patient.  Any additional testing or evaluation was explained to the patient.  Nothing by mouth instructions were discussed and patient's questions were answered prior to conclusion to this encounter.  Patient verbalized understanding of preoperative instructions given in preadmission testing; discharge instructions available in EMR.    This note was dictated with speech recognition.  Minor errors may have been detected during use of speech recognition.

## 2025-05-14 LAB
ATRIAL RATE: 79 BPM
P AXIS: 52 DEGREES
P OFFSET: 177 MS
P ONSET: 131 MS
PR INTERVAL: 168 MS
Q ONSET: 215 MS
QRS COUNT: 13 BEATS
QRS DURATION: 80 MS
QT INTERVAL: 378 MS
QTC CALCULATION(BAZETT): 433 MS
QTC FREDERICIA: 414 MS
R AXIS: -35 DEGREES
T AXIS: 8 DEGREES
T OFFSET: 404 MS
VENTRICULAR RATE: 79 BPM

## 2025-05-14 PROCEDURE — 93005 ELECTROCARDIOGRAM TRACING: CPT

## 2025-05-20 ENCOUNTER — ANESTHESIA (OUTPATIENT)
Dept: OPERATING ROOM | Facility: HOSPITAL | Age: 55
End: 2025-05-20
Payer: COMMERCIAL

## 2025-05-20 ENCOUNTER — ANESTHESIA EVENT (OUTPATIENT)
Dept: OPERATING ROOM | Facility: HOSPITAL | Age: 55
End: 2025-05-20
Payer: COMMERCIAL

## 2025-05-20 ENCOUNTER — HOSPITAL ENCOUNTER (OUTPATIENT)
Facility: HOSPITAL | Age: 55
Setting detail: OUTPATIENT SURGERY
Discharge: HOME | End: 2025-05-20
Attending: SURGERY | Admitting: SURGERY
Payer: COMMERCIAL

## 2025-05-20 VITALS
RESPIRATION RATE: 15 BRPM | OXYGEN SATURATION: 98 % | HEART RATE: 65 BPM | BODY MASS INDEX: 30.61 KG/M2 | WEIGHT: 190.48 LBS | HEIGHT: 66 IN | SYSTOLIC BLOOD PRESSURE: 105 MMHG | DIASTOLIC BLOOD PRESSURE: 77 MMHG | TEMPERATURE: 97.2 F

## 2025-05-20 DIAGNOSIS — D17.20 LIPOMA OF HIP: Primary | ICD-10-CM

## 2025-05-20 PROBLEM — R07.9 CHEST PAIN: Status: RESOLVED | Noted: 2022-03-07 | Resolved: 2025-05-20

## 2025-05-20 PROCEDURE — 2500000004 HC RX 250 GENERAL PHARMACY W/ HCPCS (ALT 636 FOR OP/ED)

## 2025-05-20 PROCEDURE — 2500000005 HC RX 250 GENERAL PHARMACY W/O HCPCS: Mod: JZ | Performed by: SURGERY

## 2025-05-20 PROCEDURE — 7100000010 HC PHASE TWO TIME - EACH INCREMENTAL 1 MINUTE: Performed by: SURGERY

## 2025-05-20 PROCEDURE — 11404 EXC TR-EXT B9+MARG 3.1-4 CM: CPT | Performed by: SURGERY

## 2025-05-20 PROCEDURE — 7100000001 HC RECOVERY ROOM TIME - INITIAL BASE CHARGE: Performed by: SURGERY

## 2025-05-20 PROCEDURE — 3600000003 HC OR TIME - INITIAL BASE CHARGE - PROCEDURE LEVEL THREE: Performed by: SURGERY

## 2025-05-20 PROCEDURE — 7100000002 HC RECOVERY ROOM TIME - EACH INCREMENTAL 1 MINUTE: Performed by: SURGERY

## 2025-05-20 PROCEDURE — 3700000001 HC GENERAL ANESTHESIA TIME - INITIAL BASE CHARGE: Performed by: SURGERY

## 2025-05-20 PROCEDURE — 3700000002 HC GENERAL ANESTHESIA TIME - EACH INCREMENTAL 1 MINUTE: Performed by: SURGERY

## 2025-05-20 PROCEDURE — 2500000004 HC RX 250 GENERAL PHARMACY W/ HCPCS (ALT 636 FOR OP/ED): Performed by: SURGERY

## 2025-05-20 PROCEDURE — 3600000008 HC OR TIME - EACH INCREMENTAL 1 MINUTE - PROCEDURE LEVEL THREE: Performed by: SURGERY

## 2025-05-20 PROCEDURE — 2720000007 HC OR 272 NO HCPCS: Performed by: SURGERY

## 2025-05-20 PROCEDURE — 7100000009 HC PHASE TWO TIME - INITIAL BASE CHARGE: Performed by: SURGERY

## 2025-05-20 PROCEDURE — 88304 TISSUE EXAM BY PATHOLOGIST: CPT | Performed by: PATHOLOGY

## 2025-05-20 PROCEDURE — 0752T DGTZ GLS MCRSCP SLD LVL III: CPT | Mod: TC,AHULAB | Performed by: SURGERY

## 2025-05-20 RX ORDER — ONDANSETRON HYDROCHLORIDE 2 MG/ML
4 INJECTION, SOLUTION INTRAVENOUS ONCE AS NEEDED
Status: DISCONTINUED | OUTPATIENT
Start: 2025-05-20 | End: 2025-05-20 | Stop reason: HOSPADM

## 2025-05-20 RX ORDER — ACETAMINOPHEN 325 MG/1
650 TABLET ORAL EVERY 4 HOURS PRN
Status: DISCONTINUED | OUTPATIENT
Start: 2025-05-20 | End: 2025-05-20 | Stop reason: HOSPADM

## 2025-05-20 RX ORDER — OXYCODONE HYDROCHLORIDE 5 MG/1
5 TABLET ORAL EVERY 6 HOURS PRN
Qty: 12 TABLET | Refills: 0 | Status: SHIPPED | OUTPATIENT
Start: 2025-05-20 | End: 2025-05-29 | Stop reason: ALTCHOICE

## 2025-05-20 RX ORDER — FENTANYL CITRATE 50 UG/ML
INJECTION, SOLUTION INTRAMUSCULAR; INTRAVENOUS AS NEEDED
Status: DISCONTINUED | OUTPATIENT
Start: 2025-05-20 | End: 2025-05-20

## 2025-05-20 RX ORDER — OXYCODONE HYDROCHLORIDE 5 MG/1
5 TABLET ORAL EVERY 4 HOURS PRN
Status: DISCONTINUED | OUTPATIENT
Start: 2025-05-20 | End: 2025-05-20 | Stop reason: HOSPADM

## 2025-05-20 RX ORDER — LIDOCAINE HYDROCHLORIDE 20 MG/ML
INJECTION, SOLUTION INFILTRATION; PERINEURAL AS NEEDED
Status: DISCONTINUED | OUTPATIENT
Start: 2025-05-20 | End: 2025-05-20

## 2025-05-20 RX ORDER — OXYCODONE HYDROCHLORIDE 5 MG/1
10 TABLET ORAL EVERY 4 HOURS PRN
Status: DISCONTINUED | OUTPATIENT
Start: 2025-05-20 | End: 2025-05-20 | Stop reason: HOSPADM

## 2025-05-20 RX ORDER — MIDAZOLAM HYDROCHLORIDE 1 MG/ML
INJECTION INTRAMUSCULAR; INTRAVENOUS AS NEEDED
Status: DISCONTINUED | OUTPATIENT
Start: 2025-05-20 | End: 2025-05-20

## 2025-05-20 RX ORDER — CEFAZOLIN 1 G/1
INJECTION, POWDER, FOR SOLUTION INTRAVENOUS AS NEEDED
Status: DISCONTINUED | OUTPATIENT
Start: 2025-05-20 | End: 2025-05-20

## 2025-05-20 RX ORDER — METOCLOPRAMIDE HYDROCHLORIDE 5 MG/ML
10 INJECTION INTRAMUSCULAR; INTRAVENOUS ONCE AS NEEDED
Status: DISCONTINUED | OUTPATIENT
Start: 2025-05-20 | End: 2025-05-20 | Stop reason: HOSPADM

## 2025-05-20 RX ORDER — SODIUM CHLORIDE 0.9 G/100ML
INJECTION, SOLUTION IRRIGATION AS NEEDED
Status: DISCONTINUED | OUTPATIENT
Start: 2025-05-20 | End: 2025-05-20 | Stop reason: HOSPADM

## 2025-05-20 RX ORDER — PROPOFOL 10 MG/ML
INJECTION, EMULSION INTRAVENOUS AS NEEDED
Status: DISCONTINUED | OUTPATIENT
Start: 2025-05-20 | End: 2025-05-20

## 2025-05-20 RX ORDER — LIDOCAINE HYDROCHLORIDE 10 MG/ML
0.1 INJECTION, SOLUTION EPIDURAL; INFILTRATION; INTRACAUDAL; PERINEURAL ONCE
Status: DISCONTINUED | OUTPATIENT
Start: 2025-05-20 | End: 2025-05-20 | Stop reason: HOSPADM

## 2025-05-20 RX ADMIN — SODIUM CHLORIDE, SODIUM LACTATE, POTASSIUM CHLORIDE, AND CALCIUM CHLORIDE: .6; .31; .03; .02 INJECTION, SOLUTION INTRAVENOUS at 13:54

## 2025-05-20 RX ADMIN — PROPOFOL 50 MG: 10 INJECTION, EMULSION INTRAVENOUS at 14:03

## 2025-05-20 RX ADMIN — PROPOFOL 75 MCG/KG/MIN: 10 INJECTION, EMULSION INTRAVENOUS at 14:04

## 2025-05-20 RX ADMIN — FENTANYL CITRATE 50 MCG: 50 INJECTION, SOLUTION INTRAMUSCULAR; INTRAVENOUS at 14:12

## 2025-05-20 RX ADMIN — CEFAZOLIN 2 G: 330 INJECTION, POWDER, FOR SOLUTION INTRAMUSCULAR; INTRAVENOUS at 14:07

## 2025-05-20 RX ADMIN — LIDOCAINE HYDROCHLORIDE 4 ML: 20 INJECTION, SOLUTION INFILTRATION; PERINEURAL at 14:03

## 2025-05-20 RX ADMIN — MIDAZOLAM HYDROCHLORIDE 2 MG: 1 INJECTION, SOLUTION INTRAMUSCULAR; INTRAVENOUS at 14:00

## 2025-05-20 SDOH — HEALTH STABILITY: MENTAL HEALTH: CURRENT SMOKER: 0

## 2025-05-20 ASSESSMENT — PAIN SCALES - GENERAL
PAINLEVEL_OUTOF10: 0 - NO PAIN
PAIN_LEVEL: 0
PAINLEVEL_OUTOF10: 0 - NO PAIN
PAINLEVEL_OUTOF10: 0 - NO PAIN

## 2025-05-20 ASSESSMENT — PAIN - FUNCTIONAL ASSESSMENT
PAIN_FUNCTIONAL_ASSESSMENT: 0-10
PAIN_FUNCTIONAL_ASSESSMENT: 0-10
PAIN_FUNCTIONAL_ASSESSMENT: UNABLE TO SELF-REPORT
PAIN_FUNCTIONAL_ASSESSMENT: 0-10

## 2025-05-20 ASSESSMENT — COLUMBIA-SUICIDE SEVERITY RATING SCALE - C-SSRS
1. IN THE PAST MONTH, HAVE YOU WISHED YOU WERE DEAD OR WISHED YOU COULD GO TO SLEEP AND NOT WAKE UP?: NO
6. HAVE YOU EVER DONE ANYTHING, STARTED TO DO ANYTHING, OR PREPARED TO DO ANYTHING TO END YOUR LIFE?: NO
2. HAVE YOU ACTUALLY HAD ANY THOUGHTS OF KILLING YOURSELF?: NO

## 2025-05-20 NOTE — PERIOPERATIVE NURSING NOTE
Patient in Phase 2; dressed and up to chair with RN assist. Tolerating po fluids, no complaint of pain and no complaint of nausea.     Family at bedside; discussed discharge instructions with patient and Family. All questions at this time answered.     Discharge instructions provided using teachback method.  Patient's health-related risk factors discussed with patient.  Patient educated to look for worsening signs and symptoms and educated to seek medical attention if experiencing medical emergency.  Patient aware of needs to follow up with outpatient clinics as scheduled. Home going meds reviewed with patient.  Patient verbalized understanding of disposition and discharge instructions.  All questions answered to patient's satisfaction and within nursing scope of practice.    Patient clinically appropriate for discharge. Vitals stable/baseline.IV removed and patient transported to discharge area via wheelchair.  2

## 2025-05-20 NOTE — OP NOTE
Excisional biopsy of skin cyst right hip (R) Operative Note     Date: 2025  OR Location: [facility] OR    Name: Portillo Barrios : 1970, Age: 55 y.o., MRN: [id number], Sex: male    Diagnosis  Pre-op Diagnosis      * soft tissue mass right hip  Post-op Diagnosis     * Epidermoid cyst of right hip       Procedures     Excisional biopsy of skin cyst right hip     Surgeons      * Rajesh Fritz - Primary    Resident/Fellow/Other Assistant:  Surgeons and Role:  * No surgeons found with a matching role *    Staff:   Surgical Assistant:   Circulator: Ijeoma Khan Person: Estrellita Tejada Circulator: Faiza    Anesthesia Staff: Anesthesiologist: Estrellita Mcnamara DO; Pippa Pulido MD  C-AA: WON Boyd    Procedure Summary  Anesthesia: General  ASA: III  Estimated Blood Loss: 3 mL  Intra-op Medications: Administrations occurring from 1230 to 1330 on 25:  * No intraprocedure medications in log *           Anesthesia Record               Intraprocedure I/O Totals       None           Specimen:   ID Type Source Tests Collected by Time   1 : right hip cyst Tissue SKIN CYST SURGICAL PATHOLOGY EXAM Rajesh Fritz MD 2025 1423               Findings: 3.5 cm epidermoid cyst      Indications: Portillo Barrios is an 55 y.o. male who is having surgery for Lipoma of hip [D17.20].     The patient was seen in the preoperative area. The risks, benefits, complications, treatment options, non-operative alternatives, expected recovery and outcomes were discussed with the patient. The possibilities of reaction to medication, pulmonary aspiration, injury to surrounding structures, bleeding, recurrent infection, the need for additional procedures, failure to diagnose a condition, and creating a complication requiring transfusion or operation were discussed with the patient. The patient concurred with the proposed plan, giving informed consent.  The site of surgery was properly noted/marked if necessary per policy. The  patient has been actively warmed in preoperative area. Preoperative antibiotics have been ordered and given within 1 hours of incision. Venous thrombosis prophylaxis have been ordered including bilateral sequential compression devices     Procedure Details:   Patient is a 55-year-old gentleman who is referred for evaluation and treatment of a soft tissue mass  on his right lateral hip.  This has been slow growing for more than 20 years.  More recently has become symptomatic with pain and tenderness when he sleeps on his right side.    On exam there is a oval 4 x 3 cm subcutaneous mass in the right lateral hip.  He comes in for excisional biopsy.  Risk, benefits and alternatives were discussed preoperatively and he agrees to the operation.    Description of procedure:  Patient taken the operating room, placed supine in the operating table.  Timeout was established, IV analgesia was induced.  He did receive antibiotics.  The right lateral hip was sterilely prepared.  We infiltrated with a solution 1% lidocaine, quarter percent Marcaine.  We made a transverse incision and carried our dissection down through subcutaneous tissues.  We readily encountered a well-circumscribed cyst.  It was excised in its entirety and handed off the field as a specimen piecemeal.  In aggregate it measured at least 3.5 cm.  We irrigated the wound and then after ensuring hemostasis we closed the incision in layers with the deeper layers closed with 3-0 interrupted Vicryl suture followed by running 4-0 Biosyn subcuticular closure.  Dermabond glue was used to seal the wound.  Patient tolerated the procedure without difficulty and was returned to the recovery room in stable condition    Complications:  None; patient tolerated the procedure well.    Disposition: PACU - hemodynamically stable.  Condition: stable           Attending Attestation: I was present and scrubbed for the entire procedure.    Rajesh Fritz  Phone Number: [phone]

## 2025-05-20 NOTE — ANESTHESIA POSTPROCEDURE EVALUATION
Patient: Poritllo Barrios    Procedure Summary       Date: 05/20/25 Room / Location: [facility] OR 01 / Virtual [facility] OR    Anesthesia Start: 1354 Anesthesia Stop: 1450    Procedure: Right Torso Lesion Excision (Right) Diagnosis:       Lipoma of hip      (Lipoma of hip [D17.20])    Surgeons: Rajesh Fritz MD Responsible Provider: Pippa Pulido MD    Anesthesia Type: general ASA Status: 3            Anesthesia Type: general    Vitals Value Taken Time   BP 94/71 05/20/25 15:01   Temp 36.9 °C (98.4 °F) 05/20/25 14:43   Pulse 81 05/20/25 15:13   Resp 12 05/20/25 15:00   SpO2 97 % 05/20/25 15:13   Vitals shown include unfiled device data.    Anesthesia Post Evaluation    Patient location during evaluation: bedside  Patient participation: complete - patient participated  Level of consciousness: awake and alert  Pain score: 0  Pain management: adequate  Airway patency: patent  Cardiovascular status: hemodynamically stable  Respiratory status: acceptable  Hydration status: acceptable  Postoperative Nausea and Vomiting: none        There were no known notable events for this encounter.

## 2025-05-20 NOTE — ANESTHESIA PREPROCEDURE EVALUATION
Patient: Portillo Barrios    Procedure Information       Date/Time: 25 1230    Procedure: Right Torso Lesion Excision (Right)    Location: [facility] OR 01 /  [facility] OR    Surgeons: Rajesh Fritz MD            Relevant Problems   Cardiac   (+) Chest pain (Resolved)      Pulmonary   (+) Asthma      Neuro   (+) Bipolar disorder      Endocrine   (+) Obesity       Clinical information reviewed:   Tobacco  Allergies  Meds   Med Hx  Surg Hx   Fam Hx  Soc Hx        NPO Detail:  NPO/Void Status  Carbohydrate Drink Given Prior to Surgery? : N  Date of Last Liquid: 25  Time of Last Liquid: 730  Date of Last Solid: 25  Time of Last Solid:   Last Intake Type: Clear fluids  Time of Last Void: 700         Physical Exam    Airway  Mallampati: III  TM distance: >3 FB  Neck ROM: limited  Mouth openin finger widths     Cardiovascular - normal examRate: normal     Dental - normal exam     Pulmonary - normal examBreath sounds clear to auscultation     Abdominal - normal exam           Anesthesia Plan    History of general anesthesia?: yes  History of complications of general anesthesia?: no    ASA 3     MAC     The patient is not a current smoker.    intravenous induction   Anesthetic plan and risks discussed with patient.  Use of blood products discussed with patient who.    Plan discussed with CAA.

## 2025-05-21 ASSESSMENT — PAIN SCALES - GENERAL: PAINLEVEL_OUTOF10: 4

## 2025-05-29 ENCOUNTER — OFFICE VISIT (OUTPATIENT)
Dept: PRIMARY CARE | Facility: CLINIC | Age: 55
End: 2025-05-29
Payer: COMMERCIAL

## 2025-05-29 VITALS
BODY MASS INDEX: 30.7 KG/M2 | TEMPERATURE: 97.1 F | WEIGHT: 191 LBS | SYSTOLIC BLOOD PRESSURE: 112 MMHG | DIASTOLIC BLOOD PRESSURE: 74 MMHG | HEIGHT: 66 IN | OXYGEN SATURATION: 98 % | HEART RATE: 101 BPM

## 2025-05-29 DIAGNOSIS — J45.20 MILD INTERMITTENT ASTHMA WITHOUT COMPLICATION (HHS-HCC): ICD-10-CM

## 2025-05-29 DIAGNOSIS — R00.2 PALPITATIONS: ICD-10-CM

## 2025-05-29 DIAGNOSIS — I48.0 PAROXYSMAL ATRIAL FIBRILLATION (MULTI): Primary | ICD-10-CM

## 2025-05-29 DIAGNOSIS — F31.81 BIPOLAR II DISORDER (MULTI): ICD-10-CM

## 2025-05-29 PROCEDURE — 93000 ELECTROCARDIOGRAM COMPLETE: CPT | Performed by: FAMILY MEDICINE

## 2025-05-29 PROCEDURE — 99213 OFFICE O/P EST LOW 20 MIN: CPT | Performed by: FAMILY MEDICINE

## 2025-05-29 PROCEDURE — 3008F BODY MASS INDEX DOCD: CPT | Performed by: FAMILY MEDICINE

## 2025-05-29 PROCEDURE — 1036F TOBACCO NON-USER: CPT | Performed by: FAMILY MEDICINE

## 2025-05-29 ASSESSMENT — PAIN SCALES - GENERAL: PAINLEVEL_OUTOF10: 0-NO PAIN

## 2025-05-29 NOTE — LETTER
May 29, 2025     Patient: Portillo Barrios   YOB: 1970   Date of Visit: 5/29/2025       To Whom It May Concern:    Portillo Barrios was seen in my clinic on 5/29/2025 at 8:30 am. Please excuse Portillo for his absence from work.  Off work 5/28/25, 5/29/25.  Return on 5/30/25.    If you have any questions or concerns, please don't hesitate to call.         Sincerely,         Juaquin Zhang MD        CC: No Recipients

## 2025-05-29 NOTE — PROGRESS NOTES
"Subjective   Patient ID: Portillo Barrios is a 55 y.o. male who presents for Palpitations (Patient had a procedure with anesthesia last week. Since then he has been having notices of afib on his apple watch.He does feel some palpitations and racing heart.).    HPI patient had an elective surgical procedure done about a week ago to remove a lipoma to his leg.  Since that time his smart watch has been telling him that he is going in and out of atrial fibrillation.  Patient has no sensations of chest pain or abnormal heart rhythm.  Patient also has sleep apnea.  He is on CPAP  Review of Systems  Constitutional: Patient is negative for fever, fatigue, weight change.  HEENT: Patient is negative for change in vision, hearing, swallow.  Cardio: Patient is negative for chest pain, lower extremity edema.  Pulmonary: Patient is negative for cough, shortness of breath.  Objective   /74 (BP Location: Left arm)   Pulse 101   Temp 36.2 °C (97.1 °F)   Ht 1.676 m (5' 6\")   Wt 86.6 kg (191 lb)   SpO2 98%   BMI 30.83 kg/m²     Physical Exam  General: Awake and alert no apparent distress.  HEENT: Moist oral mucosa no cervical lymphadenopathy.  Cardio: Heart S1-S2 irregularly irregular.  Pulmonary: Lungs clear to auscultation bilaterally.  Assessment/Plan   Problem List Items Addressed This Visit           ICD-10-CM    Asthma mild intermittent.  Patient to use albuterol as needed. J45.909     Other Visit Diagnoses         Codes      Paroxysmal atrial fibrillation (Multi)    -  Primary needs referral.  Patient referred to cardiology for new onset paroxysmal A-fib.  Continue on propranolol 10 mg twice daily. I48.0    Relevant Orders    Referral to Cardiology      Palpitations    new onset, see above. R00.2    Relevant Orders    ECG 12 Lead (Completed)      Bipolar II disorder (Multi)    stable.  See specialist. F31.81               "

## 2025-06-02 LAB
LABORATORY COMMENT REPORT: NORMAL
PATH REPORT.FINAL DX SPEC: NORMAL
PATH REPORT.GROSS SPEC: NORMAL
PATH REPORT.RELEVANT HX SPEC: NORMAL
PATH REPORT.TOTAL CANCER: NORMAL

## 2025-06-03 ENCOUNTER — APPOINTMENT (OUTPATIENT)
Dept: CARDIOLOGY | Facility: HOSPITAL | Age: 55
End: 2025-06-03
Payer: COMMERCIAL

## 2025-06-03 ENCOUNTER — APPOINTMENT (OUTPATIENT)
Dept: RADIOLOGY | Facility: HOSPITAL | Age: 55
End: 2025-06-03
Payer: COMMERCIAL

## 2025-06-03 ENCOUNTER — HOSPITAL ENCOUNTER (OUTPATIENT)
Facility: HOSPITAL | Age: 55
Setting detail: OBSERVATION
Discharge: HOME | End: 2025-06-04
Attending: EMERGENCY MEDICINE | Admitting: INTERNAL MEDICINE
Payer: COMMERCIAL

## 2025-06-03 DIAGNOSIS — I48.91 ATRIAL FIBRILLATION WITH RAPID VENTRICULAR RESPONSE (MULTI): Primary | ICD-10-CM

## 2025-06-03 DIAGNOSIS — I48.91 ATRIAL FIBRILLATION, UNSPECIFIED TYPE (MULTI): ICD-10-CM

## 2025-06-03 DIAGNOSIS — I48.91 ATRIAL FIBRILLATION WITH RVR (MULTI): ICD-10-CM

## 2025-06-03 LAB
ALBUMIN SERPL BCP-MCNC: 4.2 G/DL (ref 3.4–5)
ALP SERPL-CCNC: 45 U/L (ref 33–120)
ALT SERPL W P-5'-P-CCNC: 29 U/L (ref 10–52)
ANION GAP SERPL CALC-SCNC: 12 MMOL/L (ref 10–20)
APTT PPP: 28 SECONDS (ref 26–36)
AST SERPL W P-5'-P-CCNC: 35 U/L (ref 9–39)
BASOPHILS # BLD AUTO: 0.07 X10*3/UL (ref 0–0.1)
BASOPHILS NFR BLD AUTO: 0.8 %
BILIRUB SERPL-MCNC: 1.1 MG/DL (ref 0–1.2)
BUN SERPL-MCNC: 19 MG/DL (ref 6–23)
CALCIUM SERPL-MCNC: 9 MG/DL (ref 8.6–10.3)
CARDIAC TROPONIN I PNL SERPL HS: 6 NG/L (ref 0–20)
CARDIAC TROPONIN I PNL SERPL HS: 7 NG/L (ref 0–20)
CHLORIDE SERPL-SCNC: 109 MMOL/L (ref 98–107)
CO2 SERPL-SCNC: 22 MMOL/L (ref 21–32)
CREAT SERPL-MCNC: 1.41 MG/DL (ref 0.5–1.3)
EGFRCR SERPLBLD CKD-EPI 2021: 60 ML/MIN/1.73M*2
EOSINOPHIL # BLD AUTO: 0.1 X10*3/UL (ref 0–0.7)
EOSINOPHIL NFR BLD AUTO: 1.1 %
ERYTHROCYTE [DISTWIDTH] IN BLOOD BY AUTOMATED COUNT: 13.1 % (ref 11.5–14.5)
GLUCOSE SERPL-MCNC: 128 MG/DL (ref 74–99)
HCT VFR BLD AUTO: 45.8 % (ref 41–52)
HGB BLD-MCNC: 15.9 G/DL (ref 13.5–17.5)
IMM GRANULOCYTES # BLD AUTO: 0.04 X10*3/UL (ref 0–0.7)
IMM GRANULOCYTES NFR BLD AUTO: 0.4 % (ref 0–0.9)
INR PPP: 1.1 (ref 0.9–1.1)
LYMPHOCYTES # BLD AUTO: 2.29 X10*3/UL (ref 1.2–4.8)
LYMPHOCYTES NFR BLD AUTO: 25.7 %
MAGNESIUM SERPL-MCNC: 1.98 MG/DL (ref 1.6–2.4)
MCH RBC QN AUTO: 30.9 PG (ref 26–34)
MCHC RBC AUTO-ENTMCNC: 34.7 G/DL (ref 32–36)
MCV RBC AUTO: 89 FL (ref 80–100)
MONOCYTES # BLD AUTO: 0.79 X10*3/UL (ref 0.1–1)
MONOCYTES NFR BLD AUTO: 8.9 %
NEUTROPHILS # BLD AUTO: 5.61 X10*3/UL (ref 1.2–7.7)
NEUTROPHILS NFR BLD AUTO: 63.1 %
NRBC BLD-RTO: 0 /100 WBCS (ref 0–0)
PLATELET # BLD AUTO: 295 X10*3/UL (ref 150–450)
POTASSIUM SERPL-SCNC: 3.5 MMOL/L (ref 3.5–5.3)
PROT SERPL-MCNC: 6.3 G/DL (ref 6.4–8.2)
PROTHROMBIN TIME: 11.6 SECONDS (ref 9.8–12.4)
RBC # BLD AUTO: 5.15 X10*6/UL (ref 4.5–5.9)
SODIUM SERPL-SCNC: 139 MMOL/L (ref 136–145)
TSH SERPL-ACNC: 2.01 MIU/L (ref 0.44–3.98)
WBC # BLD AUTO: 8.9 X10*3/UL (ref 4.4–11.3)

## 2025-06-03 PROCEDURE — 84484 ASSAY OF TROPONIN QUANT: CPT | Performed by: EMERGENCY MEDICINE

## 2025-06-03 PROCEDURE — 83735 ASSAY OF MAGNESIUM: CPT | Performed by: EMERGENCY MEDICINE

## 2025-06-03 PROCEDURE — 93005 ELECTROCARDIOGRAM TRACING: CPT

## 2025-06-03 PROCEDURE — 96366 THER/PROPH/DIAG IV INF ADDON: CPT

## 2025-06-03 PROCEDURE — 85025 COMPLETE CBC W/AUTO DIFF WBC: CPT | Performed by: EMERGENCY MEDICINE

## 2025-06-03 PROCEDURE — 84443 ASSAY THYROID STIM HORMONE: CPT | Performed by: EMERGENCY MEDICINE

## 2025-06-03 PROCEDURE — 80053 COMPREHEN METABOLIC PANEL: CPT | Performed by: EMERGENCY MEDICINE

## 2025-06-03 PROCEDURE — 85610 PROTHROMBIN TIME: CPT | Performed by: EMERGENCY MEDICINE

## 2025-06-03 PROCEDURE — 71045 X-RAY EXAM CHEST 1 VIEW: CPT | Performed by: STUDENT IN AN ORGANIZED HEALTH CARE EDUCATION/TRAINING PROGRAM

## 2025-06-03 PROCEDURE — 84443 ASSAY THYROID STIM HORMONE: CPT | Performed by: NURSE PRACTITIONER

## 2025-06-03 PROCEDURE — 99285 EMERGENCY DEPT VISIT HI MDM: CPT | Mod: 25 | Performed by: EMERGENCY MEDICINE

## 2025-06-03 PROCEDURE — 71045 X-RAY EXAM CHEST 1 VIEW: CPT

## 2025-06-03 PROCEDURE — 96375 TX/PRO/DX INJ NEW DRUG ADDON: CPT

## 2025-06-03 PROCEDURE — 36415 COLL VENOUS BLD VENIPUNCTURE: CPT | Performed by: EMERGENCY MEDICINE

## 2025-06-03 PROCEDURE — 2500000004 HC RX 250 GENERAL PHARMACY W/ HCPCS (ALT 636 FOR OP/ED): Performed by: EMERGENCY MEDICINE

## 2025-06-03 PROCEDURE — 96365 THER/PROPH/DIAG IV INF INIT: CPT

## 2025-06-03 PROCEDURE — 85730 THROMBOPLASTIN TIME PARTIAL: CPT | Performed by: EMERGENCY MEDICINE

## 2025-06-03 RX ORDER — HEPARIN SODIUM 10000 [USP'U]/100ML
0-4500 INJECTION, SOLUTION INTRAVENOUS CONTINUOUS
Status: DISCONTINUED | OUTPATIENT
Start: 2025-06-03 | End: 2025-06-04

## 2025-06-03 RX ORDER — METOPROLOL TARTRATE 1 MG/ML
5 INJECTION, SOLUTION INTRAVENOUS ONCE
Status: COMPLETED | OUTPATIENT
Start: 2025-06-03 | End: 2025-06-03

## 2025-06-03 RX ADMIN — METOPROLOL TARTRATE 5 MG: 5 INJECTION INTRAVENOUS at 21:57

## 2025-06-03 RX ADMIN — HEPARIN SODIUM 1600 UNITS/HR: 10000 INJECTION, SOLUTION INTRAVENOUS at 22:21

## 2025-06-03 ASSESSMENT — PAIN - FUNCTIONAL ASSESSMENT: PAIN_FUNCTIONAL_ASSESSMENT: 0-10

## 2025-06-03 ASSESSMENT — PAIN DESCRIPTION - LOCATION: LOCATION: CHEST

## 2025-06-03 ASSESSMENT — PAIN DESCRIPTION - PROGRESSION: CLINICAL_PROGRESSION: NOT CHANGED

## 2025-06-03 ASSESSMENT — PAIN DESCRIPTION - DESCRIPTORS: DESCRIPTORS: TIGHTNESS

## 2025-06-03 ASSESSMENT — PAIN SCALES - GENERAL
PAINLEVEL_OUTOF10: 4
PAINLEVEL_OUTOF10: 4

## 2025-06-03 ASSESSMENT — PAIN DESCRIPTION - PAIN TYPE: TYPE: ACUTE PAIN

## 2025-06-04 ENCOUNTER — APPOINTMENT (OUTPATIENT)
Dept: CARDIOLOGY | Facility: HOSPITAL | Age: 55
End: 2025-06-04
Payer: COMMERCIAL

## 2025-06-04 ENCOUNTER — PHARMACY VISIT (OUTPATIENT)
Dept: PHARMACY | Facility: CLINIC | Age: 55
End: 2025-06-04
Payer: MEDICARE

## 2025-06-04 ENCOUNTER — ANESTHESIA EVENT (OUTPATIENT)
Dept: CARDIOLOGY | Facility: HOSPITAL | Age: 55
End: 2025-06-04
Payer: COMMERCIAL

## 2025-06-04 ENCOUNTER — ANESTHESIA (OUTPATIENT)
Dept: CARDIOLOGY | Facility: HOSPITAL | Age: 55
End: 2025-06-04
Payer: COMMERCIAL

## 2025-06-04 VITALS
WEIGHT: 192.24 LBS | HEIGHT: 66 IN | RESPIRATION RATE: 18 BRPM | HEART RATE: 84 BPM | BODY MASS INDEX: 30.9 KG/M2 | DIASTOLIC BLOOD PRESSURE: 77 MMHG | SYSTOLIC BLOOD PRESSURE: 102 MMHG | OXYGEN SATURATION: 98 % | TEMPERATURE: 96.8 F

## 2025-06-04 PROBLEM — F41.9 ANXIETY: Status: ACTIVE | Noted: 2025-06-04

## 2025-06-04 PROBLEM — I48.91 ATRIAL FIBRILLATION WITH RVR (MULTI): Status: ACTIVE | Noted: 2025-06-04

## 2025-06-04 LAB
ALBUMIN SERPL BCP-MCNC: 4.1 G/DL (ref 3.4–5)
ALP SERPL-CCNC: 43 U/L (ref 33–120)
ALT SERPL W P-5'-P-CCNC: 30 U/L (ref 10–52)
ANION GAP SERPL CALC-SCNC: 15 MMOL/L (ref 10–20)
AORTIC VALVE MEAN GRADIENT: 3 MMHG
AORTIC VALVE PEAK VELOCITY: 1.04 M/S
AST SERPL W P-5'-P-CCNC: 40 U/L (ref 9–39)
AV PEAK GRADIENT: 4 MMHG
AVA (PEAK VEL): 2.04 CM2
AVA (VTI): 1.92 CM2
BILIRUB SERPL-MCNC: 1.4 MG/DL (ref 0–1.2)
BODY SURFACE AREA: 2.01 M2
BUN SERPL-MCNC: 18 MG/DL (ref 6–23)
CALCIUM SERPL-MCNC: 9.5 MG/DL (ref 8.6–10.3)
CHLORIDE SERPL-SCNC: 107 MMOL/L (ref 98–107)
CO2 SERPL-SCNC: 23 MMOL/L (ref 21–32)
CREAT SERPL-MCNC: 1.41 MG/DL (ref 0.5–1.3)
EGFRCR SERPLBLD CKD-EPI 2021: 60 ML/MIN/1.73M*2
EJECTION FRACTION APICAL 4 CHAMBER: 28.4
EJECTION FRACTION: 50 %
EJECTION FRACTION: 50 %
ERYTHROCYTE [DISTWIDTH] IN BLOOD BY AUTOMATED COUNT: 13.2 % (ref 11.5–14.5)
GLUCOSE SERPL-MCNC: 88 MG/DL (ref 74–99)
HCT VFR BLD AUTO: 48.4 % (ref 41–52)
HGB BLD-MCNC: 16.4 G/DL (ref 13.5–17.5)
HOLD SPECIMEN: 293
LEFT ATRIUM VOLUME AREA LENGTH INDEX BSA: 22.9 ML/M2
LEFT VENTRICLE INTERNAL DIMENSION DIASTOLE: 3.75 CM (ref 3.5–6)
LEFT VENTRICULAR OUTFLOW TRACT DIAMETER: 1.91 CM
MCH RBC QN AUTO: 30.9 PG (ref 26–34)
MCHC RBC AUTO-ENTMCNC: 33.9 G/DL (ref 32–36)
MCV RBC AUTO: 91 FL (ref 80–100)
NRBC BLD-RTO: 0 /100 WBCS (ref 0–0)
PLATELET # BLD AUTO: 284 X10*3/UL (ref 150–450)
POTASSIUM SERPL-SCNC: 4.2 MMOL/L (ref 3.5–5.3)
PROT SERPL-MCNC: 6 G/DL (ref 6.4–8.2)
Q ONSET: 215 MS
QRS COUNT: 17 BEATS
QRS DURATION: 82 MS
QT INTERVAL: 348 MS
QTC CALCULATION(BAZETT): 446 MS
QTC FREDERICIA: 411 MS
R AXIS: -42 DEGREES
RBC # BLD AUTO: 5.3 X10*6/UL (ref 4.5–5.9)
RIGHT VENTRICLE FREE WALL PEAK S': 10.64 CM/S
RIGHT VENTRICLE PEAK SYSTOLIC PRESSURE: 23 MMHG
SODIUM SERPL-SCNC: 141 MMOL/L (ref 136–145)
T AXIS: 4 DEGREES
T OFFSET: 389 MS
TRICUSPID ANNULAR PLANE SYSTOLIC EXCURSION: 1.7 CM
TSH SERPL-ACNC: 2.01 MIU/L (ref 0.44–3.98)
UFH PPP CHRO-ACNC: 0.6 IU/ML (ref ?–1.1)
UFH PPP CHRO-ACNC: 0.7 IU/ML (ref ?–1.1)
VENTRICULAR RATE: 99 BPM
WBC # BLD AUTO: 7.9 X10*3/UL (ref 4.4–11.3)

## 2025-06-04 PROCEDURE — A93312 PR ECHO HEART,TRANSESOPHAGEAL,COMPLETE: Performed by: ANESTHESIOLOGY

## 2025-06-04 PROCEDURE — 93306 TTE W/DOPPLER COMPLETE: CPT | Performed by: INTERNAL MEDICINE

## 2025-06-04 PROCEDURE — 93005 ELECTROCARDIOGRAM TRACING: CPT

## 2025-06-04 PROCEDURE — 93320 DOPPLER ECHO COMPLETE: CPT

## 2025-06-04 PROCEDURE — 92960 CARDIOVERSION ELECTRIC EXT: CPT | Performed by: INTERNAL MEDICINE

## 2025-06-04 PROCEDURE — G0378 HOSPITAL OBSERVATION PER HR: HCPCS

## 2025-06-04 PROCEDURE — 93306 TTE W/DOPPLER COMPLETE: CPT

## 2025-06-04 PROCEDURE — 2500000005 HC RX 250 GENERAL PHARMACY W/O HCPCS: Performed by: INTERNAL MEDICINE

## 2025-06-04 PROCEDURE — 36415 COLL VENOUS BLD VENIPUNCTURE: CPT | Performed by: NURSE PRACTITIONER

## 2025-06-04 PROCEDURE — 93312 ECHO TRANSESOPHAGEAL: CPT | Performed by: INTERNAL MEDICINE

## 2025-06-04 PROCEDURE — A93312 PR ECHO HEART,TRANSESOPHAGEAL,COMPLETE: Performed by: ANESTHESIOLOGIST ASSISTANT

## 2025-06-04 PROCEDURE — 99239 HOSP IP/OBS DSCHRG MGMT >30: CPT | Performed by: INTERNAL MEDICINE

## 2025-06-04 PROCEDURE — 2500000004 HC RX 250 GENERAL PHARMACY W/ HCPCS (ALT 636 FOR OP/ED): Performed by: ANESTHESIOLOGIST ASSISTANT

## 2025-06-04 PROCEDURE — 93320 DOPPLER ECHO COMPLETE: CPT | Performed by: INTERNAL MEDICINE

## 2025-06-04 PROCEDURE — 3700000001 HC GENERAL ANESTHESIA TIME - INITIAL BASE CHARGE

## 2025-06-04 PROCEDURE — 80053 COMPREHEN METABOLIC PANEL: CPT | Performed by: NURSE PRACTITIONER

## 2025-06-04 PROCEDURE — 85027 COMPLETE CBC AUTOMATED: CPT | Performed by: NURSE PRACTITIONER

## 2025-06-04 PROCEDURE — 85520 HEPARIN ASSAY: CPT | Performed by: NURSE PRACTITIONER

## 2025-06-04 PROCEDURE — 93246 EXT ECG>7D<15D RECORDING: CPT

## 2025-06-04 PROCEDURE — 2500000001 HC RX 250 WO HCPCS SELF ADMINISTERED DRUGS (ALT 637 FOR MEDICARE OP): Performed by: NURSE PRACTITIONER

## 2025-06-04 PROCEDURE — 99223 1ST HOSP IP/OBS HIGH 75: CPT | Performed by: INTERNAL MEDICINE

## 2025-06-04 PROCEDURE — 3700000002 HC GENERAL ANESTHESIA TIME - EACH INCREMENTAL 1 MINUTE

## 2025-06-04 PROCEDURE — 2500000004 HC RX 250 GENERAL PHARMACY W/ HCPCS (ALT 636 FOR OP/ED): Mod: JW | Performed by: ANESTHESIOLOGIST ASSISTANT

## 2025-06-04 PROCEDURE — 93319 3D ECHO IMG CGEN CAR ANOMAL: CPT | Performed by: INTERNAL MEDICINE

## 2025-06-04 PROCEDURE — RXMED WILLOW AMBULATORY MEDICATION CHARGE

## 2025-06-04 PROCEDURE — 99222 1ST HOSP IP/OBS MODERATE 55: CPT | Performed by: NURSE PRACTITIONER

## 2025-06-04 PROCEDURE — 99223 1ST HOSP IP/OBS HIGH 75: CPT | Performed by: NURSE PRACTITIONER

## 2025-06-04 RX ORDER — PROPRANOLOL HYDROCHLORIDE 10 MG/1
10 TABLET ORAL 2 TIMES DAILY
Status: DISCONTINUED | OUTPATIENT
Start: 2025-06-04 | End: 2025-06-04

## 2025-06-04 RX ORDER — MIDAZOLAM HYDROCHLORIDE 1 MG/ML
INJECTION INTRAMUSCULAR; INTRAVENOUS AS NEEDED
Status: DISCONTINUED | OUTPATIENT
Start: 2025-06-04 | End: 2025-06-04

## 2025-06-04 RX ORDER — POLYETHYLENE GLYCOL 3350 17 G/17G
17 POWDER, FOR SOLUTION ORAL DAILY PRN
Status: DISCONTINUED | OUTPATIENT
Start: 2025-06-04 | End: 2025-06-04 | Stop reason: HOSPADM

## 2025-06-04 RX ORDER — PROPOFOL 10 MG/ML
INJECTION, EMULSION INTRAVENOUS AS NEEDED
Status: DISCONTINUED | OUTPATIENT
Start: 2025-06-04 | End: 2025-06-04

## 2025-06-04 RX ORDER — LIDOCAINE HYDROCHLORIDE 20 MG/ML
SOLUTION OROPHARYNGEAL AS NEEDED
Status: DISCONTINUED | OUTPATIENT
Start: 2025-06-04 | End: 2025-06-04 | Stop reason: HOSPADM

## 2025-06-04 RX ORDER — PROPRANOLOL HYDROCHLORIDE 10 MG/1
20 TABLET ORAL 2 TIMES DAILY
Status: DISCONTINUED | OUTPATIENT
Start: 2025-06-04 | End: 2025-06-04 | Stop reason: HOSPADM

## 2025-06-04 RX ORDER — ESCITALOPRAM OXALATE 10 MG/1
10 TABLET ORAL DAILY
Status: DISCONTINUED | OUTPATIENT
Start: 2025-06-04 | End: 2025-06-04

## 2025-06-04 RX ORDER — LAMOTRIGINE 100 MG/1
200 TABLET ORAL 2 TIMES DAILY
Status: DISCONTINUED | OUTPATIENT
Start: 2025-06-04 | End: 2025-06-04 | Stop reason: HOSPADM

## 2025-06-04 RX ORDER — LIDOCAINE HYDROCHLORIDE 20 MG/ML
INJECTION, SOLUTION EPIDURAL; INFILTRATION; INTRACAUDAL; PERINEURAL AS NEEDED
Status: DISCONTINUED | OUTPATIENT
Start: 2025-06-04 | End: 2025-06-04

## 2025-06-04 RX ORDER — PROPRANOLOL HYDROCHLORIDE 20 MG/1
20 TABLET ORAL 2 TIMES DAILY
Qty: 60 TABLET | Refills: 1 | Status: SHIPPED | OUTPATIENT
Start: 2025-06-04 | End: 2025-08-03

## 2025-06-04 RX ADMIN — PROPOFOL 40 MG: 10 INJECTION, EMULSION INTRAVENOUS at 13:59

## 2025-06-04 RX ADMIN — PROPOFOL 20 MG: 10 INJECTION, EMULSION INTRAVENOUS at 13:51

## 2025-06-04 RX ADMIN — PROPOFOL 30 MG: 10 INJECTION, EMULSION INTRAVENOUS at 13:47

## 2025-06-04 RX ADMIN — PROPOFOL 60 MG: 10 INJECTION, EMULSION INTRAVENOUS at 13:45

## 2025-06-04 RX ADMIN — LAMOTRIGINE 150 MG: 100 TABLET ORAL at 08:12

## 2025-06-04 RX ADMIN — PROPRANOLOL HYDROCHLORIDE 10 MG: 10 TABLET ORAL at 08:13

## 2025-06-04 RX ADMIN — SODIUM CHLORIDE: 9 INJECTION, SOLUTION INTRAVENOUS at 13:41

## 2025-06-04 RX ADMIN — LIDOCAINE HYDROCHLORIDE 50 MG: 20 INJECTION, SOLUTION EPIDURAL; INFILTRATION; INTRACAUDAL; PERINEURAL at 13:45

## 2025-06-04 RX ADMIN — MIDAZOLAM HYDROCHLORIDE 2 MG: 1 INJECTION, SOLUTION INTRAMUSCULAR; INTRAVENOUS at 13:45

## 2025-06-04 RX ADMIN — APIXABAN 5 MG: 5 TABLET, FILM COATED ORAL at 10:11

## 2025-06-04 RX ADMIN — BENZOCAINE, BUTAMBEN, AND TETRACAINE HYDROCHLORIDE 2 SPRAY: .028; .004; .004 AEROSOL, SPRAY TOPICAL at 13:46

## 2025-06-04 RX ADMIN — LIDOCAINE HYDROCHLORIDE 15 ML: 20 SOLUTION ORAL; TOPICAL at 13:46

## 2025-06-04 SDOH — ECONOMIC STABILITY: HOUSING INSECURITY: IN THE PAST 12 MONTHS, HOW MANY TIMES HAVE YOU MOVED WHERE YOU WERE LIVING?: 0

## 2025-06-04 SDOH — ECONOMIC STABILITY: FOOD INSECURITY: WITHIN THE PAST 12 MONTHS, THE FOOD YOU BOUGHT JUST DIDN'T LAST AND YOU DIDN'T HAVE MONEY TO GET MORE.: NEVER TRUE

## 2025-06-04 SDOH — ECONOMIC STABILITY: HOUSING INSECURITY: AT ANY TIME IN THE PAST 12 MONTHS, WERE YOU HOMELESS OR LIVING IN A SHELTER (INCLUDING NOW)?: NO

## 2025-06-04 SDOH — ECONOMIC STABILITY: FOOD INSECURITY: WITHIN THE PAST 12 MONTHS, YOU WORRIED THAT YOUR FOOD WOULD RUN OUT BEFORE YOU GOT THE MONEY TO BUY MORE.: NEVER TRUE

## 2025-06-04 SDOH — SOCIAL STABILITY: SOCIAL INSECURITY: ABUSE: ADULT

## 2025-06-04 SDOH — ECONOMIC STABILITY: FOOD INSECURITY: HOW HARD IS IT FOR YOU TO PAY FOR THE VERY BASICS LIKE FOOD, HOUSING, MEDICAL CARE, AND HEATING?: NOT HARD AT ALL

## 2025-06-04 SDOH — ECONOMIC STABILITY: INCOME INSECURITY: IN THE PAST 12 MONTHS HAS THE ELECTRIC, GAS, OIL, OR WATER COMPANY THREATENED TO SHUT OFF SERVICES IN YOUR HOME?: NO

## 2025-06-04 SDOH — SOCIAL STABILITY: SOCIAL INSECURITY: HAVE YOU HAD ANY THOUGHTS OF HARMING ANYONE ELSE?: NO

## 2025-06-04 SDOH — SOCIAL STABILITY: SOCIAL INSECURITY: DO YOU FEEL ANYONE HAS EXPLOITED OR TAKEN ADVANTAGE OF YOU FINANCIALLY OR OF YOUR PERSONAL PROPERTY?: NO

## 2025-06-04 SDOH — SOCIAL STABILITY: SOCIAL INSECURITY: WITHIN THE LAST YEAR, HAVE YOU BEEN AFRAID OF YOUR PARTNER OR EX-PARTNER?: NO

## 2025-06-04 SDOH — SOCIAL STABILITY: SOCIAL INSECURITY: DO YOU FEEL UNSAFE GOING BACK TO THE PLACE WHERE YOU ARE LIVING?: NO

## 2025-06-04 SDOH — ECONOMIC STABILITY: HOUSING INSECURITY: IN THE LAST 12 MONTHS, WAS THERE A TIME WHEN YOU WERE NOT ABLE TO PAY THE MORTGAGE OR RENT ON TIME?: NO

## 2025-06-04 SDOH — SOCIAL STABILITY: SOCIAL INSECURITY
WITHIN THE LAST YEAR, HAVE YOU BEEN KICKED, HIT, SLAPPED, OR OTHERWISE PHYSICALLY HURT BY YOUR PARTNER OR EX-PARTNER?: NO

## 2025-06-04 SDOH — SOCIAL STABILITY: SOCIAL INSECURITY
WITHIN THE LAST YEAR, HAVE YOU BEEN RAPED OR FORCED TO HAVE ANY KIND OF SEXUAL ACTIVITY BY YOUR PARTNER OR EX-PARTNER?: NO

## 2025-06-04 SDOH — SOCIAL STABILITY: SOCIAL INSECURITY: ARE THERE ANY APPARENT SIGNS OF INJURIES/BEHAVIORS THAT COULD BE RELATED TO ABUSE/NEGLECT?: NO

## 2025-06-04 SDOH — SOCIAL STABILITY: SOCIAL INSECURITY: HAS ANYONE EVER THREATENED TO HURT YOUR FAMILY OR YOUR PETS?: NO

## 2025-06-04 SDOH — SOCIAL STABILITY: SOCIAL INSECURITY: WITHIN THE LAST YEAR, HAVE YOU BEEN HUMILIATED OR EMOTIONALLY ABUSED IN OTHER WAYS BY YOUR PARTNER OR EX-PARTNER?: NO

## 2025-06-04 SDOH — SOCIAL STABILITY: SOCIAL INSECURITY: ARE YOU OR HAVE YOU BEEN THREATENED OR ABUSED PHYSICALLY, EMOTIONALLY, OR SEXUALLY BY ANYONE?: NO

## 2025-06-04 SDOH — SOCIAL STABILITY: SOCIAL INSECURITY: HAVE YOU HAD THOUGHTS OF HARMING ANYONE ELSE?: NO

## 2025-06-04 SDOH — ECONOMIC STABILITY: TRANSPORTATION INSECURITY: IN THE PAST 12 MONTHS, HAS LACK OF TRANSPORTATION KEPT YOU FROM MEDICAL APPOINTMENTS OR FROM GETTING MEDICATIONS?: NO

## 2025-06-04 SDOH — SOCIAL STABILITY: SOCIAL INSECURITY: WERE YOU ABLE TO COMPLETE ALL THE BEHAVIORAL HEALTH SCREENINGS?: NO

## 2025-06-04 SDOH — SOCIAL STABILITY: SOCIAL INSECURITY: DOES ANYONE TRY TO KEEP YOU FROM HAVING/CONTACTING OTHER FRIENDS OR DOING THINGS OUTSIDE YOUR HOME?: NO

## 2025-06-04 ASSESSMENT — COGNITIVE AND FUNCTIONAL STATUS - GENERAL
DAILY ACTIVITIY SCORE: 24
PATIENT BASELINE BEDBOUND: NO
DAILY ACTIVITIY SCORE: 24
MOBILITY SCORE: 24
MOBILITY SCORE: 24

## 2025-06-04 ASSESSMENT — PATIENT HEALTH QUESTIONNAIRE - PHQ9
SUM OF ALL RESPONSES TO PHQ9 QUESTIONS 1 & 2: 0
1. LITTLE INTEREST OR PLEASURE IN DOING THINGS: NOT AT ALL
2. FEELING DOWN, DEPRESSED OR HOPELESS: NOT AT ALL

## 2025-06-04 ASSESSMENT — ENCOUNTER SYMPTOMS
EYES NEGATIVE: 1
SHORTNESS OF BREATH: 1
ALLERGIC/IMMUNOLOGIC NEGATIVE: 1
HEMATOLOGIC/LYMPHATIC NEGATIVE: 1
CARDIOVASCULAR NEGATIVE: 1
GASTROINTESTINAL NEGATIVE: 1
ENDOCRINE NEGATIVE: 1
FATIGUE: 1
NERVOUS/ANXIOUS: 1
MUSCULOSKELETAL NEGATIVE: 1
NEUROLOGICAL NEGATIVE: 1

## 2025-06-04 ASSESSMENT — ACTIVITIES OF DAILY LIVING (ADL)
FEEDING YOURSELF: INDEPENDENT
ADEQUATE_TO_COMPLETE_ADL: YES
TOILETING: INDEPENDENT
BATHING: INDEPENDENT
JUDGMENT_ADEQUATE_SAFELY_COMPLETE_DAILY_ACTIVITIES: YES
GROOMING: INDEPENDENT
HEARING - LEFT EAR: FUNCTIONAL
LACK_OF_TRANSPORTATION: NO
WALKS IN HOME: INDEPENDENT
LACK_OF_TRANSPORTATION: NO
HEARING - RIGHT EAR: FUNCTIONAL
DRESSING YOURSELF: INDEPENDENT
PATIENT'S MEMORY ADEQUATE TO SAFELY COMPLETE DAILY ACTIVITIES?: YES

## 2025-06-04 ASSESSMENT — LIFESTYLE VARIABLES
AUDIT-C TOTAL SCORE: 0
HOW OFTEN DO YOU HAVE 6 OR MORE DRINKS ON ONE OCCASION: NEVER
HOW MANY STANDARD DRINKS CONTAINING ALCOHOL DO YOU HAVE ON A TYPICAL DAY: PATIENT DOES NOT DRINK
SKIP TO QUESTIONS 9-10: 1
HOW OFTEN DO YOU HAVE A DRINK CONTAINING ALCOHOL: NEVER
AUDIT-C TOTAL SCORE: 0

## 2025-06-04 ASSESSMENT — PAIN - FUNCTIONAL ASSESSMENT
PAIN_FUNCTIONAL_ASSESSMENT: 0-10
PAIN_FUNCTIONAL_ASSESSMENT: 0-10

## 2025-06-04 ASSESSMENT — PAIN SCALES - GENERAL
PAINLEVEL_OUTOF10: 0 - NO PAIN

## 2025-06-04 NOTE — POST-PROCEDURE NOTE
Physician Transition of Care Summary  Invasive Cardiovascular Lab    Procedure Date: 6/4/2025  Attending:  Madi Williamson DO  Resident/Fellow/Other Assistant: * No surgeons found in log *    Indications:   Atrial Fibrillation     Post-procedure diagnosis:   Atrial Fibrillation     Procedure(s):   Michele Guided Cardiovernsion       Procedure Findings:   -- Successful Cardioversion from Atrial Fibrillation to Sinus Rhythm       Description of the Procedure:   Counselling was provided to the patient including risks and benefits of the procedure. Patient provided written consent for transesophageal echocardiogram and DC Electrical Cardioversion.  Universal Protocol: a time out was performed and the correct patient and procedure were verified.     Patient was placed on continuous cardiac monitoring and continuous pulse oximetry. Supplemental oxygen was administered via nasal cannula. Electrodes were placed in an anterior/posterior fashion.    Patient received Lidocaine gargle and spray to suppress gag reflex.  IV sedation by anesthesia.  Cardiologist intubated the patient's esophagus with probe while the Sonographer operated the ultrasound machine controls.  After all images were acquired and reviewed by the Cardiologist, the esophagus was extubated.      There were no procedural complications noted.  Complete report to follow.     After appropriate level of sedation was achieved (please see separate sedation procedure note), synchronized cardioversion was performed at 200 joules with successful conversion to sinus rhythm.  .    Complications:   None.  Patient tolerated the procedure well.    Stents/Implants:   Implants       No implant documentation for this case.            Anticoagulation/Antiplatelet Plan:   Continue current anticoagulation     Estimated Blood Loss:   * No values recorded between 6/4/2025 12:31 PM and 6/4/2025  2:12 PM *    Anesthesia: * No anesthesia type entered * Anesthesia Staff:  Anesthesiologist: Robel Fox MD  C-AA: WON Roman    Any Specimen(s) Removed:   No specimens collected during this procedure.    Disposition:   Return to floor      Electronically signed by: Madi Williamson DO, 6/4/2025 2:51 PM

## 2025-06-04 NOTE — PROGRESS NOTES
Transitional Care Coordination Progress Note:  Plan per Medical/Surgical team: treatment of new afib RVR with cardio consult, heparin gtt, lopressor, ECHO pending, ?anticoag plan   Status: Observation  Payor source: medical [facility] (?Healthy @ home)  Discharge disposition: Home alone   Potential Barriers: HR 81, ?anticoag plan   ADOD: 6/5/2025   CHERYL Russell RN, BSN Transitional Care Coordinator ED# [phone]      06/04/25 0701   Discharge Planning   Living Arrangements Alone   Support Systems Spouse/significant other;Children;Family members   Assistance Needed cardio consult, heparin gtt, lopressor, ECHO pending, ?anticoag plan   Type of Residence Private residence   Number of Stairs to Enter Residence 4   Number of Stairs Within Residence 0   Do you have animals or pets at home? Yes   Type of Animals or Pets dog   Home or Post Acute Services None   Expected Discharge Disposition Home   Does the patient need discharge transport arranged? No   Financial Resource Strain   How hard is it for you to pay for the very basics like food, housing, medical care, and heating? Not hard   Housing Stability   In the last 12 months, was there a time when you were not able to pay the mortgage or rent on time? N   In the past 12 months, how many times have you moved where you were living? 1   At any time in the past 12 months, were you homeless or living in a shelter (including now)? N   Transportation Needs   In the past 12 months, has lack of transportation kept you from medical appointments or from getting medications? no   In the past 12 months, has lack of transportation kept you from meetings, work, or from getting things needed for daily living? No   Stroke Family Assessment   Stroke Family Assessment Needed No   Intensity of Service   Intensity of Service 0-30 min

## 2025-06-04 NOTE — H&P
History Of Present Illness  Portillo Barrios is a 55 y.o. male presenting with new onset atrial fibrillation, here for TYRON with possible DCCV. PMH includes DENG on CPAP, asthma, bipolar disorder, chronic tremor, lipoma on leg s/p excision on 5/20/25.     Past Medical History:  Medical History[1]     Past Surgical History:  Surgical History[2]       Social History:  Social History[3]    Family History:  Family History[4]     Allergies:  RX Allergies[5]     Home Medications:  Current Outpatient Medications   Medication Instructions    albuterol 90 mcg/actuation inhaler 2 puffs, inhalation, Every 6 hours PRN    apixaban (ELIQUIS) 5 mg, oral, Every 12 hours    lamoTRIgine (LAMICTAL) 200 mg, oral, 2 times daily, Can take an extra half tab if needed - indication: mood stabilizer    magnesium glycinate 118 mg magnesium capsule 1 capsule, Nightly PRN    multivitamin tablet 1 tablet, Daily    propranolol (INDERAL) 10 mg, oral, 2 times daily PRN    sildenafil (VIAGRA) 50 mg, oral, Daily PRN       Inpatient Medications:  Scheduled Medications[6]  PRN Medications[7]  Continuous Medications[8]      Review of Systems   Constitutional:  Positive for fatigue.   HENT: Negative.     Eyes: Negative.    Respiratory:  Positive for shortness of breath.    Cardiovascular: Negative.    Gastrointestinal: Negative.    Endocrine: Negative.    Genitourinary: Negative.    Musculoskeletal: Negative.    Skin: Negative.    Allergic/Immunologic: Negative.    Neurological: Negative.    Hematological: Negative.    Psychiatric/Behavioral:  The patient is nervous/anxious.           Physical Exam  Constitutional:       General: He is awake. He is not in acute distress.     Appearance: He is not ill-appearing.   Cardiovascular:      Rate and Rhythm: Normal rate. Rhythm irregularly irregular.      Pulses:           Radial pulses are 2+ on the right side and 2+ on the left side.        Dorsalis pedis pulses are 1+ on the right side and 1+ on the left side.     "  Heart sounds: Normal heart sounds. No murmur heard.  Pulmonary:      Effort: Pulmonary effort is normal.      Breath sounds: Normal breath sounds and air entry.   Abdominal:      General: Bowel sounds are normal.      Palpations: Abdomen is soft.      Tenderness: There is no abdominal tenderness.   Musculoskeletal:      Right lower leg: No edema.      Left lower leg: No edema.   Skin:     General: Skin is warm and dry.   Neurological:      General: No focal deficit present.      Mental Status: He is alert and oriented to person, place, and time.      GCS: GCS eye subscore is 4. GCS verbal subscore is 5. GCS motor subscore is 6.   Psychiatric:         Mood and Affect: Mood normal.         Behavior: Behavior is cooperative.        Sedation Plan    ASA 3     Mallampati class: II.           NPO since last night around 1830    Last Recorded Vitals  Blood pressure 112/72, pulse 82, temperature 36.5 °C (97.7 °F), temperature source Temporal, resp. rate 17, height 1.676 m (5' 6\"), weight 87.2 kg (192 lb 3.9 oz), SpO2 98%.         Vitals from the Past 24 Hours  Heart Rate:  [78-91]   Temp:  [36.1 °C (97 °F)-36.5 °C (97.7 °F)]   Resp:  [14-20]   BP: ()/(64-82)   Height:  [167.6 cm (5' 6\")]   Weight:  [87.2 kg (192 lb 3.9 oz)]   SpO2:  [91 %-99 %]          Relevant Results    Labs    CBC:   Recent Labs     06/04/25 0448 06/03/25 2058 05/13/25 1559 03/27/25  1053 03/12/24  0958 03/09/23  0907   WBC 7.9 8.9 9.7 6.8 7.8 6.8   HGB 16.4 15.9 16.0 15.9 16.2 16.6*   HCT 48.4 45.8 47.6 46.6 48.7 47.7    295 241 227 247 254   MCV 91 89 89 90.8 91 90.7     BMP/CMP:   Recent Labs     06/04/25 0448 06/03/25 2058 05/13/25 1559 03/27/25  1053 03/12/24  0958 03/09/23  0907    139 138 139 140 141   K 4.2 3.5 4.0 4.3 4.5 4.6    109* 103 102 101 104   BUN 18 19 16 15 11 18   CREATININE 1.41* 1.41* 1.52* 1.28 1.30 1.4   CO2 23 22 28 27 27 25   CALCIUM 9.5 9.0 9.4 9.6 9.4 9.5   PROT 6.0* 6.3* 6.6 6.6 6.4 6.6 " "  BILITOT 1.4* 1.1 1.3* 1.5* 1.4* 1.7*   ALKPHOS 43 45 44 43 53 50   ALT 30 29 39 35 31 25   AST 40* 35 38 30 28 22   GLUCOSE 88 128* 88 97 99 104*      Magnesium:   Recent Labs     06/03/25 2058   MG 1.98     Lipid Panel:   Recent Labs     03/27/25  1053 03/12/24  0958 03/09/23  0907 03/04/22  1303   CHOL 132 135 128* 149   HDL 41 40.0* 45 49   CHHDL 3.2 3.4 2.8 3.0   TRIG 100 117 69 76   NHDL 91  --   --   --      Cardiac   Results from last 7 days   Lab Units 06/03/25 2202 06/03/25 2058   TROPHS ng/L 7 6      Hemoglobin A1C:   Recent Labs     03/27/25  1053 03/12/24  0958 03/09/23  0907   HGBA1C 5.8* 5.5 5.5     TSH/ Free T4:   Recent Labs     06/03/25 2058   TSH 2.01  2.01     Iron: No results for input(s): \"FERRITIN\", \"TIBC\", \"IRONSAT\", \"BNP\" in the last 78731 hours.  Coag:   Results from last 7 days   Lab Units 06/03/25 2202   INR  1.1     ABO: No results found for: \"ABO\"    Past Cardiology Tests (Last 3 Years):    EKG:  Recent Labs     06/03/25 2054 05/14/25  0828   ATRRATE  --  79   VENTRATE 99 79   PRINT  --  168   QRSDUR 82 80   QTCFRED 411 414   QTCCALCB 446 433     Encounter Date: 06/03/25   ECG 12 lead   Result Value    Ventricular Rate 99    QRS Duration 82    QT Interval 348    QTC Calculation(Bazett) 446    R Axis -42    T Axis 4    QRS Count 17    Q Onset 215    T Offset 389    QTC Fredericia 411    Narrative    Atrial fibrillation  Left axis deviation  Abnormal ECG  When compared with ECG of 13-MAY-2025 15:35,  Atrial fibrillation has replaced Sinus rhythm     Echo:  Echocardiogram:   Transthoracic Echo (TTE) Complete 06/04/2025    PHYSICIAN INTERPRETATION:  Left Ventricle: Left ventricular ejection fraction is mildly decreased by visual estimate at 50%. The patient is in atrial fibrillation/flutter which may influence the estimate of left ventricular function and transvalvular flows. There is global hypokinesis of the left ventricle with minor regional variations. The left ventricular " cavity size is normal. There is normal septal and normal posterior left ventricular wall thickness. There is left ventricular concentric remodeling. Left ventricular diastolic filling cannot be determined due to atrial fibrillation/flutter.  Left Atrium: The left atrial size is normal.  Right Ventricle: The right ventricle is normal in size. There is normal right ventricular global systolic function.  Right Atrium: The right atrium is mildly dilated.  Aortic Valve: The aortic valve is trileaflet. The aortic valve area by VTI is 1.92 cmï¿½ with a peak velocity of 1.04 m/s. The peak and mean gradients are 4 mmHg and 3 mmHg, respectively, with a dimensionless index of 0.67. There is no evidence of aortic valve regurgitation.  Mitral Valve: The mitral valve is normal in structure. There is trace mitral valve regurgitation.  Tricuspid Valve: The tricuspid valve is structurally normal. There is trace to mild tricuspid regurgitation. The doppler estimated RVSP is within normal limits with a right ventricular systolic pressure of 23 mmHg.  Pulmonic Valve: The pulmonic valve is structurally normal. There is no indication of pulmonic valve regurgitation.  Pericardium: There is no pericardial effusion noted.  Aorta: The aortic root is normal. The Ao Sinus is 3.00 cm. The Asc Ao is 3.00 cm.  Systemic Veins: The inferior vena cava appears normal in size, with IVC inspiratory collapse greater than 50%.  In comparison to the previous echocardiogram(s): There are no prior studies on this patient for comparison purposes.      CONCLUSIONS:  1. Left ventricular ejection fraction is mildly decreased by visual estimate at 50%.  2. There is global hypokinesis of the left ventricle with minor regional variations.  3. There is normal right ventricular global systolic function.  4. The doppler estimated RVSP is within normal limits with a right ventricular systolic pressure of 23 mmHg.  5. The patient is in atrial fibrillation/flutter which  may influence the estimate of left ventricular function and transvalvular flows.      Ejection Fractions:  LV EF   Date/Time Value Ref Range Status   06/04/2025 10:14 AM 50 %      Cath:  Coronary Angiography: No results found for this or any previous visit from the past 1800 days.    Right Heart Cath: No results found for this or any previous visit from the past 1800 days.    Stress Test:  Nuclear:No results found for this or any previous visit from the past 1800 days.    Metabolic Stress: No results found for this or any previous visit from the past 1800 days.    Cardiac Imaging:  Cardiac Scoring: No results found for this or any previous visit from the past 1800 days.    Cardiac MRI: No results found for this or any previous visit from the past 1800 days.         Assessment/Plan  Assessment/Plan   Assessment & Plan  Atrial fibrillation with RVR (Multi)    new onset atrial fibrillation  -TYRON with possible DCCV with Dr. Williamson on 6/4/25       NP discussed with Dr. Williamson regarding plan of care/ discharge plan      I spent 30 minutes in the professional and overall care of this patient.      James Freire, APRN-CNP, DNP         [1]   Past Medical History:  Diagnosis Date    Anxiety     Asthma     Depression     GI bleed     History of blood transfusion     Pilonidal cyst     Sleep apnea     Sleep difficulties    [2]   Past Surgical History:  Procedure Laterality Date    APPENDECTOMY      Laparoscopic    COLONOSCOPY      PILONIDAL CYST DRAINAGE      UPPER GASTROINTESTINAL ENDOSCOPY      WISDOM TOOTH EXTRACTION     [3]   Social History  Tobacco Use    Smoking status: Never     Passive exposure: Never    Smokeless tobacco: Never   Vaping Use    Vaping status: Never Used   Substance Use Topics    Alcohol use: Not Currently    Drug use: Never   [4]   Family History  Problem Relation Name Age of Onset    Diabetes Mother      Tremor Mother      Other (car accident) Father          age 43    Tremor Sister     [5] No  Known Allergies  [6]   Scheduled medications   Medication Dose Route Frequency    apixaban  5 mg oral q12h    lamoTRIgine  200 mg oral BID    propranolol  20 mg oral BID   [7]   PRN medications   Medication    polyethylene glycol   [8]   Continuous Medications   Medication Dose Last Rate

## 2025-06-04 NOTE — CARE PLAN
The patient's goals for the shift include      The clinical goals for the shift include Remain hds      Problem: Pain - Adult  Goal: Verbalizes/displays adequate comfort level or baseline comfort level  Outcome: Progressing     Problem: Safety - Adult  Goal: Free from fall injury  Outcome: Progressing     Problem: Discharge Planning  Goal: Discharge to home or other facility with appropriate resources  Outcome: Progressing     Problem: Chronic Conditions and Co-morbidities  Goal: Patient's chronic conditions and co-morbidity symptoms are monitored and maintained or improved  Outcome: Progressing     Problem: Nutrition  Goal: Nutrient intake appropriate for maintaining nutritional needs  Outcome: Progressing     Problem: Pain  Goal: Takes deep breaths with improved pain control throughout the shift  Outcome: Progressing  Goal: Turns in bed with improved pain control throughout the shift  Outcome: Progressing  Goal: Walks with improved pain control throughout the shift  Outcome: Progressing  Goal: Performs ADL's with improved pain control throughout shift  Outcome: Progressing  Goal: Participates in PT with improved pain control throughout the shift  Outcome: Progressing  Goal: Free from opioid side effects throughout the shift  Outcome: Progressing  Goal: Free from acute confusion related to pain meds throughout the shift  Outcome: Progressing

## 2025-06-04 NOTE — DISCHARGE INSTRUCTIONS
Transesophageal Echocardiogram:  Post-Procedure and Homegoing Instructions    Please follow the instructions below after your transesophageal echocardiogram:  1) Do not drive or operate machinery for 24 hours after your procedure.  2) Do not eat or drink anything for two hours after your procedure, until __3:50pm on 6/4/25__________.  Start with a little bit of water to be sure you can swallow without coughing.  3) Avoid alcohol for at least 24 hours after the test.  5) You may have a mild sore throat for a day or two. Cough drops may help after the  two hour wait. Tylenol may also be taken once you can swallow.  6) Although the symptoms below are rare, call your doctor at once, or seek emergency  care if you have:  - Bright red blood in your sputum - Sustained chest pain  - Severe shortness of breath - Severe abdominal pain  - Allergy symptoms (hives, rash, nausea, vomiting, difficulty breathing  7) If you are an out-patient, a nurse will have placed an IV during the study for sedation  and contrast injection. The nurse will remove the IV before she sends you home. Leave  the band-aid on for 24 hours and then check the IV site for signs of infection, such as:  ? Increasing soreness  ? Redness  ? Drainage from the puncture site    If you notice any of these conditions, you should contact your doctor immediately.               Cardioversion     Cardioversion is a non-surgical way to restore your heart's normal rhythm. Medication may be tried first to correct an irregular heartbeat, but if medicines do not work, electrical cardioversion may be needed. The electrical current should make your heartbeat normally again.      After the procedure-    Your heart rate, blood pressure and respirations will be checked frequently by the nurse until you are fully alert.      When the patches are removed form your chest, you may notice redness, irritation, or itching similar to a mild sunburn. You may You may use over the counter  hydrocortisone 1% cream and apply up to three times a day for any irritation to your skin on your chest.      Discharge information-   Have an adult with you to get you home from the hospital; you will not be allowed to drive for 24 hours after the procedure.      You may resume your usual routine after discharge.      Make sure you and your family understands how you are to take your medications. The doctor will tell you what to do with your cardiac medicines and your anti-coagulation medicines.      There is a small chance your irregular heartbeat may return. If you feel skipped beats, rapid heart rate, or chest tightness, call your doctor.

## 2025-06-04 NOTE — PROGRESS NOTES
Pharmacy Medication History Review    Portillo Barrios is a 55 y.o. male admitted for Atrial fibrillation with RVR (Multi). Pharmacy reviewed the patient's [id number] medications and allergies for accuracy.    Below are additional concerns with the patient's PTA list.  Collected from patient.  He is not taking Lexapro and reports only using propanolol as needed for tremors if he is doing a task that requires a steady hand.     The list below reflectives the updated PTA list. Please review each medication in order reconciliation for additional clarification and justification.  Prior to Admission Medications   Prescriptions   albuterol 90 mcg/actuation inhaler   Sig: INHALE 2 PUFFS EVERY 6 HOURS IF NEEDED FOR SHORTNESS OF BREATH.   lamoTRIgine (LaMICtal) 200 mg tablet   Sig: Take 1 tablet (200 mg) by mouth 2 times a day. Can take an extra half tab if needed - indication: mood stabilizer   magnesium glycinate 118 mg magnesium capsule   Sig: Take 1 capsule by mouth as needed at bedtime (insomnia).   multivitamin tablet   Sig: Take 1 tablet by mouth once daily.   propranolol (Inderal) 10 mg tablet   Sig: Take 1 tablet (10 mg) by mouth 2 times a day as needed (tremors).   sildenafil (Viagra) 50 mg tablet   Sig: Take 1 tablet (50 mg) by mouth once daily as needed for erectile dysfunction.   Patient not taking: Reported on 6/4/2025      Facility-Administered Medications: None              Mariangel Garsia, PharmD

## 2025-06-04 NOTE — ED PROVIDER NOTES
Emergency Department Provider Note       History of Present Illness     History provided by: Patient  Limitations to History: None  External Records Reviewed with Brief Summary: None    HPI:  Portillo Barrios is a 55 y.o. male with past medical history of sleep apnea on CPAP status post recent lipomas present with complaint of intermittent episodes of palpitations and A-fib noted on his watch following his lipoma removal.  He did see his PMD and there was a plan to refer to cardiology.  Palpitations have worsened and he does present for further evaluation.  He does have persistent shortness of breath and some associated chest pain when exerting himself.  No infectious symptoms    Physical Exam   Triage vitals:  T 36.1 °C (97 °F)  HR 90  /78  RR 20  O2 98 % None (Room air)    Physical Exam  Vitals and nursing note reviewed.   Constitutional:       General: He is not in acute distress.     Appearance: He is well-developed and normal weight. He is not ill-appearing.   HENT:      Head: Normocephalic and atraumatic.   Eyes:      Extraocular Movements: Extraocular movements intact.      Pupils: Pupils are equal, round, and reactive to light.   Cardiovascular:      Rate and Rhythm: Tachycardia present. Rhythm irregular.      Heart sounds: Normal heart sounds.   Pulmonary:      Effort: Pulmonary effort is normal.   Chest:      Chest wall: No deformity or tenderness.   Abdominal:      General: Bowel sounds are normal.      Palpations: Abdomen is soft. There is no mass.   Musculoskeletal:         General: Normal range of motion.      Cervical back: Normal range of motion and neck supple.      Right lower leg: No tenderness. No edema.      Left lower leg: No tenderness. No edema.   Skin:     General: Skin is warm and dry.      Capillary Refill: Capillary refill takes less than 2 seconds.   Neurological:      General: No focal deficit present.      Mental Status: He is alert and oriented to person, place, and time.    Psychiatric:         Mood and Affect: Mood normal.         Behavior: Behavior normal.           Medical Decision Making & ED Course   Medical Decision Makin y.o. male with past medical history of sleep apnea on CPAP, bipolar does present with complaint of palpitations.  He was found to be in persistent A-fib at intermittently rates of 120s to 130s.  He is started on IV metoprolol.  EKG interpreted by me showed again A-fib at a rate of 99 with no acute ischemic changes.  Given the degree of shortness of breath and his symptomatic nature I did start him on heparin, IV metoprolol 5 mg and hobs admission for cards consult for echo possible cardioversion  ----      Differential diagnoses considered include but are not limited to: afib    Social Determinants of Health which Significantly Impact Care: Social Determinants of Health which Significantly Impact Care: None identified     EKG Independent Interpretation: EKG interpreted by myself. Please see ED Course for full interpretation.    Independent Result Review and Interpretation: Relevant laboratory and radiographic results were reviewed and independently interpreted by myself.  As necessary, they are commented on in the ED Course.    Chronic conditions affecting the patient's care: As documented above in [facility]    The patient was discussed with the following consultants/services: Hospitalist/Admitting Provider who accepted the patient for admission    Care Considerations: As documented above in [facility]    ED Course:  Diagnoses as of 25 2347   Atrial fibrillation with rapid ventricular response (Multi)       Disposition   As a result of their workup, the patient will require admission to the hospital.  The patient was informed of his diagnosis.  The patient was given the opportunity to ask questions and I answered them. The patient agreed to be admitted to the hospital.    Procedures   Procedures        Izaiah Bess MD  Emergency Medicine                                                        Izaiah Bess MD  06/03/25 3555

## 2025-06-04 NOTE — CONSULTS
Inpatient consult to Cardiology  Consult performed by: SYLWIA Florez-CNP  Consult ordered by: JORDAN Phillips  Reason for consult: New onset A-fib      History Of Present Illness:    Portillo Barrios is a 55 y.o. male with past medical history of DENG on CPAP, asthma, bipolar disorder, chronic tremor, lipoma of his leg status post excision 5/20/2025.  Presented with palpitations and shortness of breath. Cardiology is consulted for new onset A-fib    Patient reports he had his lipoma excision his smart watch started to alarm send he was in atrial fibrillation.  First noticed this May 26.  He initially did not have any symptoms and only knew he was having an irregular heart rhythm because of his smart watch. He was seen by his PCP May 29/2025 diagnosed with atrial fibrillation.  He was continued  on propranolol 10 mg twice daily and referred to cardiology.  States as day progressed he started to feel more fatigued and short of breath.  Also endorses decreased exercise tolerance and episodes of lightheadedness which is reason why he came to the emergency room.     [place] [facility] [place] showed atrial fibrillation controlled ventricular rate heart rate 99 no acute ischemic changes.  Chest x-ray interpreted as no evidence of acute cardiopulmonary process. High sensitivity troponin 6/7 TSH 2.01   K 3.5 BUN 19 creatinine 1.41 ALT 29 AST 35 magnesium 1.98 WBC 8.9 hemoglobin 15.9 hematocrit 45.8 platelets 295. Initial vital signs temp 36.1 heart rate 90 RR 20 /78 pulse ox 98% on room air. He was treated with IV metoprolol tartrate 5 mg x 1 and started on heparin infusion.  He remains in atrial fibrillation on telemetry with controlled ventricular response.    Of note, patient denies any prior cardiac history or following with any outpatient cardiologist.  States he has been prescribed propranolol in the past but only uses it as needed for tremor.  Last time he used it was in February.      Home CV  "meds  Propranolol 10 mg twice daily-history of chronic tremors    Last Recorded Vitals:  Vitals:    06/04/25 0000 06/04/25 0015 06/04/25 0055 06/04/25 0341   BP: 98/74 104/71 106/70 105/77   BP Location:   Right arm Right arm   Patient Position:   Lying Lying   Pulse: 86 91 81 81   Resp: 14 17 16 16   Temp:   36.2 °C (97.2 °F) 36.2 °C (97.2 °F)   TempSrc:   Temporal Temporal   SpO2: (!) 91% 96% 99% 97%   Weight:       Height:           Last Labs:  LABS:  CMP:  Results from last 7 days   Lab Units 06/04/25 0448 06/03/25 2058   SODIUM mmol/L 141 139   POTASSIUM mmol/L 4.2 3.5   CHLORIDE mmol/L 107 109*   CO2 mmol/L 23 22   ANION GAP mmol/L 15 12   BUN mg/dL 18 19   CREATININE mg/dL 1.41* 1.41*   EGFR mL/min/1.73m*2 60* 60*   MAGNESIUM mg/dL  --  1.98   ALBUMIN g/dL 4.1 4.2   ALT U/L 30 29   AST U/L 40* 35   BILIRUBIN TOTAL mg/dL 1.4* 1.1     CBC:  Results from last 7 days   Lab Units 06/04/25 0448 06/03/25 2058   WBC AUTO x10*3/uL 7.9 8.9   HEMOGLOBIN g/dL 16.4 15.9   HEMATOCRIT % 48.4 45.8   PLATELETS AUTO x10*3/uL 284 295   MCV fL 91 89     COAG:   Results from last 7 days   Lab Units 06/03/25 2202   INR  1.1     ABO: No results found for: \"ABO\"  HEME/ENDO:  Results from last 7 days   Lab Units 06/03/25 2058   TSH mIU/L 2.01  2.01      CARDIAC:   Results from last 7 days   Lab Units 06/03/25 2202 06/03/25 2058   TROPHS ng/L 7 6     Recent Labs     03/27/25  1053 03/12/24  0958 03/09/23  0907   CHOL 132 135 128*   LDLCALC 72 72 69   HDL 41 40.0* 45   TRIG 100 117 69     IMAGINE RESULTS  XR chest 1 view   Final Result   1.  No evidence of acute cardiopulmonary process.                  MACRO:   None        Signed by: Angely Munroe 6/3/2025 11:02 PM   Dictation workstation:   [id number]      Transthoracic Echo Complete    (Results Pending)          Last I/O:  I/O last 3 completed shifts:  In: 95.7 (1.1 mL/kg) [I.V.:95.7 (1.1 mL/kg)]  Out: - (0 mL/kg)   Weight: 87.2 kg     Past Cardiology Tests (Last 3 " "Years):  EKG:  ECG 12 Lead       Echo:  No results found for this or any previous visit from the past 1095 days.    Ejection Fractions:  No results found for: \"EF\"  Cath:  No results found for this or any previous visit from the past 1095 days.    Stress Test:  No results found for this or any previous visit from the past 1095 days.    Cardiac Imaging:  No results found for this or any previous visit from the past 1095 days.      Past Medical History:  He has a past medical history of Anxiety, Asthma, Depression, GI bleed, History of blood transfusion, Pilonidal cyst, Sleep apnea, and Sleep difficulties.    Past Surgical History:  He has a past surgical history that includes Pilonidal cyst drainage; Appendectomy; Colonoscopy; Upper gastrointestinal endoscopy; and [place] tooth extraction.      Social History:  He reports that he has never smoked. He has never been exposed to tobacco smoke. He has never used smokeless tobacco. He reports that he does not currently use alcohol. He reports that he does not use drugs.    Family History:  Family History[1]     Allergies:  Patient has no known allergies.    Inpatient Medications:  Scheduled Medications[2]  PRN Medications[3]  Continuous Medications[4]  Outpatient Medications:  Current Outpatient Medications   Medication Instructions    albuterol 90 mcg/actuation inhaler 2 puffs, inhalation, Every 6 hours PRN    escitalopram (LEXAPRO) 10 mg, Daily    lamoTRIgine (LAMICTAL) 150 mg, 2 times daily    magnesium glycinate 118 mg magnesium capsule 1 capsule, Nightly PRN    multivitamin tablet 1 tablet, Daily    propranolol (INDERAL) 10 mg, 2 times daily    sildenafil (VIAGRA) 50 mg, oral, Daily PRN       Physical Exam:  GENERAL: alert, cooperative, pleasant, in no acute distress  SKIN: warm, dry  NECK: no JVD  CARDIAC: Irregularly irregular rate controlled no murmurs  PULMONARY: Normal respiratory efforts, lungs clear to auscultation bilaterally.  ABDOMEN: soft, " "nondistended  EXTREMITIES: no lower extremity edema  NEURO: Alert and oriented x 3.  Grossly normal.  Moves all 4 extremities.     I reviewed EKGs, labs and all imaging reports  I reviewed telemetry which showed atrial fibrillation controlled ventricular response     Assessment/Plan   Portillo Barrios is a 55 y.o. male with past medical history of DENG on CPAP, asthma, bipolar disorder, chronic tremor, lipoma of his leg status post excision 5/20/2025.  Presented with palpitations and shortness of breath. Cardiology is consulted for new onset A-fib    #Persistent atrial fibrillation  Initially noted on smart watch May 20.  It was seen by PCP May 29 who made official diagnosis of atrial fibrillation. Now with symptoms including shortness of breath, fatigue and lightheadedness. TSH normal    - We discussed his treatment options including rate versus rhythm control.   - He would like to proceed with elective TYRON guided cardioversion for rhythm control.  -Keep NPO for procedure today   [id number] score for Atrial Fibrillation Stroke Risk is 0  which puts pt at \" low\" \" for thromboembolic event however he understands he needs to be maintained on anticoagulation for at least 30 days post DCCV.  - Recommend starting Eliquis 5 mg twice daily  - Increase propranolol to 20 mg twice daily  - We will obtain a transthoracic echocardiogram for structural evaluation including ejection fraction, assessment of regional wall motion abnormalities or valvular disease, and further evaluation of hemodynamics.   -Recommend 2 week holter monitor at discharge to assess afib burden     -Follow up with electrophysiology in outpatient setting.       Code Status:  Full Code    Christopher Barker, APRN-CNP    ============================================  Attending Note   ============================================  Both the AMANDA and I have had a face to face encounter with the patient today. I have examined the patient and edited the documented " physical examination as necessary.  I personally reviewed the patient's recent labs, medications, orders, EKGs, and pertinent cardiac imaging.  I have reviewed the AMANDA's encounter note, approve the AMANDA's documentation and have edited the note to reflect the diagnostic and therapeutic plan.    Portillo Barrios    No exacerbating or relieving factors.  Patient denies chest pain and angina.  Pt denies shortness of breath, dyspnea on exertion, orthopnea, and paroxysmal nocturnal dyspnea.  Pt denies worsening lower extremity edema.  Pt denies palpitations or syncope.  No recent falls.  No fever or chills.  No cough.  No change in bowel or bladder habits.  No sick contacts.  No recent travel.     12 point review of systems including (Constitutional, Eyes, ENMT, Respiratory, Cardiac, Gastrointestinal, Neurological, Psychiatric, and Hematologic) was performed and is otherwise negative.    Past medical history:  As above.    Medications were reviewed.    Allergies were reviewed.    Social history:  Patient denies smoking, alcohol abuse, or illicit drug use.    Family history:  No sudden cardiac death or premature coronary artery disease.     General:  Patient is awake, alert, and oriented.  Patient is in no acute distress.  HEENT:  Pupils equal and reactive.  Normocephalic.  Moist mucosa.    Neck:  No thyromegaly.  Normal Jugular Venous Pressure.  Cardiovascular: Rate and rhythm with variable S1-S2  Pulmonary:  Clear to auscultation bilaterally.  Abdomen:  Soft. Non-tender.   Non-distended.  Positive bowel sounds.  Lower Extremities:  2+ pedal pulses. No LE edema.  Neurologic:  Cranial nerves intact.  No focal deficit.   Skin: Skin warm and dry, normal skin turgor.   Psychiatric: Normal affect.    Vital signs, telemetry, medications, labs, and imaging were reviewed as well.    Portillo Barrios is a 55 y.o. male with past medical history of DENG on CPAP, asthma, bipolar disorder, chronic tremor, lipoma of his leg status post  "excision 5/20/2025.  Presented with palpitations and shortness of breath. Cardiology is consulted for new onset A-fib    #Persistent atrial fibrillation  Initially noted on smart watch May 20.  It was seen by PCP May 29 who made official diagnosis of atrial fibrillation. Now with symptoms including shortness of breath, fatigue and lightheadedness. TSH normal    - We discussed his treatment options including rate versus rhythm control.   - He would like to proceed with elective TYRON guided cardioversion for rhythm control.  -Keep NPO for procedure today   [id number] score for Atrial Fibrillation Stroke Risk is 0  which puts pt at \" low\" \" for thromboembolic event however he understands he needs to be maintained on anticoagulation for at least 30 days post DCCV.  - Recommend starting Eliquis 5 mg twice daily  - Increase propranolol to 20 mg twice daily  - We will obtain a transthoracic echocardiogram for structural evaluation including ejection fraction, assessment of regional wall motion abnormalities or valvular disease, and further evaluation of hemodynamics.   -Recommend 2 week holter monitor at discharge to assess afib burden     -Follow up with electrophysiology in outpatient setting.       Discharge home once TYRON guided Cardioversion and recovery is completed.         Madi Wililamson DO   Division of Cardiovascular Medicine  [facility] [place]               [1]   Family History  Problem Relation Name Age of Onset    Diabetes Mother      Tremor Mother      Other (car accident) Father          age 43    Tremor Sister     [2]   Scheduled medications   Medication Dose Route Frequency    escitalopram  10 mg oral Daily    lamoTRIgine  150 mg oral BID    propranolol  10 mg oral BID   [3]   PRN medications   Medication    heparin    polyethylene glycol   [4]   Continuous Medications   Medication Dose Last Rate    heparin  0-4,500 Units/hr 1,600 Units/hr (06/04/25 1550)     "

## 2025-06-04 NOTE — CARE PLAN
The patient's goals for the shift include      The clinical goals for the shift include free from blood loss this shift      Problem: Safety - Adult  Goal: Free from fall injury  Outcome: Progressing     Problem: Pain - Adult  Goal: Verbalizes/displays adequate comfort level or baseline comfort level  Outcome: Progressing

## 2025-06-04 NOTE — PROGRESS NOTES
06/04/25 0701   ACS Disability Status   Are you deaf or do you have serious difficulty hearing? N   Are you blind or do you have serious difficulty seeing, even when wearing glasses? N   Because of a physical, mental, or emotional condition, do you have serious difficulty concentrating, remembering, or making decisions? (5 years old or older) N   Do you have serious difficulty walking or climbing stairs? N   Do you have serious difficulty dressing or bathing? N   Because of a physical, mental, or emotional condition, do you have serious difficulty doing errands alone such as visiting the doctor? N

## 2025-06-04 NOTE — ANESTHESIA PREPROCEDURE EVALUATION
Patient: Portillo Barrios    Procedure Information       Date/Time: 06/04/25 1330    Scheduled providers: Madi Williamson DO; WON Roman; Robel Fox MD    Procedure: TRANSESOPHAGEAL ECHO (TYRON) W/ POSSIBLE CARDIOVERSION    Location: [facility]; [facility]            Relevant Problems   Cardiac   (+) Atrial fibrillation with RVR (Multi)      Pulmonary   (+) Asthma      Neuro   (+) Bipolar disorder      Endocrine   (+) Obesity      Respiratory   (+) Obstructive sleep apnea       Clinical information reviewed:                   NPO Detail:  No data recorded     Physical Exam    Airway  Mallampati: II     Cardiovascular   Rhythm: irregular  Rate: normal     Dental    Pulmonary    Abdominal                                                                  Pre-Anesthesia Evaluation     Portillo Barrios is a 55 y.o. male presents for the above mentioned procedure. He is admitted to the hospital for Atrial fibrillation with RVR (Multi) [I48.91]. Today is     Code Status: Full Code  Medical History[1]  Surgical History[2]  Social History[3]  RX Allergies[4]  Medications:  Current Medications[5]  Scheduled Medications[6]  PRN Medications[7]  Continuous Medications[8]  Patient recently received an antibiotic (last 12 hours)       None          Prior to Admission medications    Medication Sig Start Date End Date Taking? Authorizing Provider   albuterol 90 mcg/actuation inhaler INHALE 2 PUFFS EVERY 6 HOURS IF NEEDED FOR SHORTNESS OF BREATH. 4/25/25   uJaquin Zhang MD   apixaban (Eliquis) 5 mg tablet Take 1 tablet (5 mg) by mouth every 12 hours. 6/4/25 9/2/25  Christopher Barker, APRN-CNP   lamoTRIgine (LaMICtal) 200 mg tablet Take 1 tablet (200 mg) by mouth 2 times a day. Can take an extra half tab if needed - indication: mood stabilizer 11/26/18   Historical Provider, MD   magnesium glycinate 118 mg magnesium capsule Take 1 capsule by mouth as needed at bedtime (insomnia).    Historical  "Provider, MD   multivitamin tablet Take 1 tablet by mouth once daily.    Historical Provider, MD   propranolol (Inderal) 10 mg tablet Take 1 tablet (10 mg) by mouth 2 times a day as needed (tremors). 7/7/23   Historical Provider, MD   sildenafil (Viagra) 50 mg tablet Take 1 tablet (50 mg) by mouth once daily as needed for erectile dysfunction.  Patient not taking: Reported on 6/4/2025 4/7/25 4/7/26  Juaquin Zhang MD   escitalopram (Lexapro) 10 mg tablet Take 1 tablet (10 mg) by mouth once daily.  6/4/25  Historical Provider, MD   oxyCODONE (Roxicodone) 5 mg immediate release tablet Take 1 tablet (5 mg) by mouth every 6 hours if needed for severe pain (7 - 10) for up to 12 doses.  Patient not taking: Reported on 5/29/2025 5/20/25 5/29/25  Rajesh Fritz MD     Vitals range for last 24 hours   Heart Rate:  [78-91]   Temp:  [36.1 °C (97 °F)-36.5 °C (97.7 °F)]   Resp:  [14-20]   BP: ()/(64-82)   Height:  [167.6 cm (5' 6\")]   Weight:  [87.2 kg (192 lb 3.9 oz)]   SpO2:  [91 %-99 %]   Visit Vitals  Smoking Status Never          Results from last 7 days   Lab Units 06/04/25 0448 06/03/25 2058   WBC AUTO x10*3/uL 7.9 8.9   HEMOGLOBIN g/dL 16.4 15.9   HEMATOCRIT % 48.4 45.8   PLATELETS AUTO x10*3/uL 284 295     Lab Results   Component Value Date/Time    PROTIME 11.6 06/03/2025 2202    INR 1.1 06/03/2025 2202     No results for input(s): \"FERRITIN\", \"TIBC\", \"IRONSAT\" in the last 81432 hours.  Results from last 7 days   Lab Units 06/04/25 0448 06/03/25 2058   EGFR mL/min/1.73m*2 60* 60*   ANION GAP mmol/L 15 12   BUN mg/dL 18 19   CREATININE mg/dL 1.41* 1.41*   SODIUM mmol/L 141 139   POTASSIUM mmol/L 4.2 3.5   CHLORIDE mmol/L 107 109*   CO2 mmol/L 23 22   GLUCOSE mg/dL 88 128*     Results from last 7 days   Lab Units 06/04/25 0448 06/03/25 2058   TSH mIU/L  --  2.01  2.01   CALCIUM mg/dL 9.5 9.0     Results from last 7 days   Lab Units 06/04/25 0448 06/03/25 2058   PROTEIN TOTAL g/dL 6.0* 6.3*   ALBUMIN " "g/dL 4.1 4.2   BILIRUBIN TOTAL mg/dL 1.4* 1.1   ALK PHOS U/L 43 45   ALT U/L 30 29   AST U/L 40* 35     Results from last 7 days   Lab Units 06/03/25 2202 06/03/25 2058   TROPHS ng/L 7 6             Lab Results   Component Value Date    HEPCAB Nonreactive 05/17/2024     No components found for: \"HIV\"  No results found for the last 14 days.     Imaging Results:  No orders to display     EKG:  Encounter Date: 06/03/25   ECG 12 lead   Result Value    Ventricular Rate 99    QRS Duration 82    QT Interval 348    QTC Calculation(Bazett) 446    R Axis -42    T Axis 4    QRS Count 17    Q Onset 215    T Offset 389    QTC Fredericia 411    Narrative    Atrial fibrillation  Left axis deviation  Abnormal ECG  When compared with ECG of 13-MAY-2025 15:35,  Atrial fibrillation has replaced Sinus rhythm     Ejection Fractions:  LV EF   Date/Time Value Ref Range Status   06/04/2025 10:14 AM 50 %      Echo:No results found for this or any previous visit from the past 78340 days.    Transthoracic Echo (TTE) Complete 06/04/2025    Narrative  [facility], [address] [place] [address]  Tel [phone] and Fax [phone]    TRANSTHORACIC ECHOCARDIOGRAM REPORT      Patient Name:       GOGO Juarez Physician:   [id number] Williamson DO  Study Date:         6/4/2025            Ordering Provider:   [id number] GURJIT RITCHIE  MRN/PID:            [id number]            Fellow:  Accession#:         [id number]        Nurse:  Date of Birth/Age:  1970 / 55      Sonographer:         Rubén Davis RDCS  years  Gender assigned at  M                   Additional Staff:  Birth:  Height:             168.00 cm           Admit Date:  Weight:             87.20 kg            Admission Status:    Observation -  Priority discharge  BSA / BMI:          1.97 m2 / 30.90     Encounter#:          [id number]  kg/m2  Blood Pressure:     105/77 mmHg         Department Location: [facility] Non  Invasive    Study Type:    " TRANSTHORACIC ECHO (TTE) COMPLETE  Diagnosis/ICD: Unspecified atrial fibrillation-I48.91  Indication:    Atrial Fibrillation  CPT Code:      Echo Complete w Full [id number]    Patient History:  BMI:               Obese >30  Pertinent History: A-Fib.    Study Detail: The following Echo studies were performed: 2D, M-Mode, Doppler and  color flow.      PHYSICIAN INTERPRETATION:  Left Ventricle: Left ventricular ejection fraction is mildly decreased by visual estimate at 50%. The patient is in atrial fibrillation/flutter which may influence the estimate of left ventricular function and transvalvular flows. There is global hypokinesis of the left ventricle with minor regional variations. The left ventricular cavity size is normal. There is normal septal and normal posterior left ventricular wall thickness. There is left ventricular concentric remodeling. Left ventricular diastolic filling cannot be determined due to atrial fibrillation/flutter.  Left Atrium: The left atrial size is normal.  Right Ventricle: The right ventricle is normal in size. There is normal right ventricular global systolic function.  Right Atrium: The right atrium is mildly dilated.  Aortic Valve: The aortic valve is trileaflet. The aortic valve area by VTI is 1.92 cmï¿½ with a peak velocity of 1.04 m/s. The peak and mean gradients are 4 mmHg and 3 mmHg, respectively, with a dimensionless index of 0.67. There is no evidence of aortic valve regurgitation.  Mitral Valve: The mitral valve is normal in structure. There is trace mitral valve regurgitation.  Tricuspid Valve: The tricuspid valve is structurally normal. There is trace to mild tricuspid regurgitation. The doppler estimated RVSP is within normal limits with a right ventricular systolic pressure of 23 mmHg.  Pulmonic Valve: The pulmonic valve is structurally normal. There is no indication of pulmonic valve regurgitation.  Pericardium: There is no pericardial effusion noted.  Aorta: The  aortic root is normal. The Ao Sinus is 3.00 cm. The Asc Ao is 3.00 cm.  Systemic Veins: The inferior vena cava appears normal in size, with IVC inspiratory collapse greater than 50%.  In comparison to the previous echocardiogram(s): There are no prior studies on this patient for comparison purposes.      CONCLUSIONS:  1. Left ventricular ejection fraction is mildly decreased by visual estimate at 50%.  2. There is global hypokinesis of the left ventricle with minor regional variations.  3. There is normal right ventricular global systolic function.  4. The doppler estimated RVSP is within normal limits with a right ventricular systolic pressure of 23 mmHg.  5. The patient is in atrial fibrillation/flutter which may influence the estimate of left ventricular function and transvalvular flows.    QUANTITATIVE DATA SUMMARY:    2D MEASUREMENTS:          Normal Ranges:  Ao Root s:       3.01 cm  IVSd:            1.01 cm  (0.6-1.1cm)  LVPWd:           0.98 cm  (0.6-1.1cm)  LVIDd:           3.75 cm  (3.9-5.9cm)  LVIDs:           2.73 cm  LV Mass Index:   58 g/m2  LVEDV Index:     32 ml/m2  LV % FS          27.4 %      LEFT ATRIUM:                  Normal Ranges:  LA Vol A4C:        37.5 ml    (22+/-6mL/m2)  LA Vol A2C:        51.2 ml  LA Vol BP:         45.2 ml  LA Vol Index A4C:  19.0ml/m2  LA Vol Index A2C:  26.0 ml/m2  LA Vol Index BP:   22.9 ml/m2  LA Area A4C:       15.0 cm2  LA Area A2C:       17.0 cm2  LA Major Axis A4C: 5.1 cm  LA Major Axis A2C: 4.8 cm  LA Volume Index:   22.9 ml/m2  LA Vol A4C:        32.9 ml  LA Vol A2C:        47.6 ml  LA Vol Index BSA:  20.4 ml/m2      M-MODE MEASUREMENTS:         Normal Ranges:  LAs:                 3.98 cm (2.7-4.0cm)      AORTA MEASUREMENTS:         Normal Ranges:  Ao Sinus, d:        3.00 cm (2.1-3.5cm)  Ao STJ, d:          2.90 cm (1.7-3.4cm)  Asc Ao, d:          3.00 cm (2.1-3.4cm)      LV SYSTOLIC FUNCTION:  Normal Ranges:  EF-A4C View:    28 % (>=55%)  EF-A2C View:     46 %  EF-Biplane:     39 %  EF-Visual:      50 %  LV EF Reported: 50 %      AORTIC VALVE:                     Normal Ranges:  AoV Vmax:                1.04 m/s (<=1.7m/s)  AoV Peak P.3 mmHg (<20mmHg)  AoV Mean P.5 mmHg (1.7-11.5mmHg)  LVOT Max Blake:            0.74 m/s (<=1.1m/s)  AoV VTI:                 19.86 cm (18-25cm)  LVOT VTI:                13.35 cm  LVOT Diameter:           1.91 cm  (1.8-2.4cm)  AoV Area, VTI:           1.92 cm2 (2.5-5.5cm2)  AoV Area,Vmax:           2.04 cm2 (2.5-4.5cm2)  AoV Dimensionless Index: 0.67      RIGHT VENTRICLE:  RV Basal 4.20 cm  RV Mid   2.60 cm  RV Major 6.8 cm  TAPSE:   17.0 mm  RV s'    0.11 m/s      TRICUSPID VALVE/RVSP:          Normal Ranges:  Peak TR Velocity:     2.22 m/s  Est. RA Pressure:     3  RV Syst Pressure:     23       (< 30mmHg)  IVC Diam:             1.69 cm      PULMONIC VALVE:          Normal Ranges:  PV Accel Time:  175 msec (>120ms)  PV Max Blake:     0.7 m/s  (0.6-0.9m/s)  PV Max P.2 mmHg  PV Mean P.4 mmHg      AORTA:  Asc Ao Diam 2.98 cm      [id number] Madi Williamson DO  Electronically signed on 2025 at 10:43:21 AM        ** Final **    Stress TestingNo results found for this or any previous visit from the past 87052 days.    Cardiac CatheterizationNo results found for this or any previous visit from the past 74163 days.    Cardiac Scoring No results found for this or any previous visit from the past 84269 days.    AAA screenNo results found for this or any previous visit from the past 09993 days.    Carotid DopplerNo results found for this or any previous visit from the past 84067 days.    OTHER No results found for this or any previous visit from the past 1825 days.    The 10-year ASCVD risk score (Lauren DK, et al., 2019) is: 2.6%    Values used to calculate the score:      Age: 55 years      Sex: Male      Is Non- : No      Diabetic: No      Tobacco smoker: No      Systolic  "Blood Pressure: 112 mmHg      Is BP treated: No      HDL Cholesterol: 41 mg/dL      Total Cholesterol: 132 mg/dL  Last I/O:    Intake/Output Summary (Last 24 hours) at 2025 121  Last data filed at 2025 0420  Gross per 24 hour   Intake 95.74 ml   Output --   Net 95.74 ml     Net IO Since Admission: 95.74 mL [25 1217]   Weight  Av.2 kg (192 lb 3.9 oz)  Min: 87.2 kg (192 lb 3.9 oz)  Max: 87.2 kg (192 lb 3.9 oz)    No results found for: \"PREGTESTUR\", \"PREGSERUM\", \"HCG\", \"HCGQUANT\"                 Anesthesia Plan    History of general anesthesia?: yes  History of complications of general anesthesia?: no    ASA 3     MAC     intravenous induction   Anesthetic plan and risks discussed with patient.    Plan discussed with CAA.           [1]  Past Medical History:  Diagnosis Date   • Anxiety    • Asthma    • Depression    • GI bleed    • History of blood transfusion    • Pilonidal cyst    • Sleep apnea    • Sleep difficulties    [2]  Past Surgical History:  Procedure Laterality Date   • APPENDECTOMY      Laparoscopic   • COLONOSCOPY     • PILONIDAL CYST DRAINAGE     • UPPER GASTROINTESTINAL ENDOSCOPY     • WISDOM TOOTH EXTRACTION     [3]  Social History  Tobacco Use   • Smoking status: Never     Passive exposure: Never   • Smokeless tobacco: Never   Vaping Use   • Vaping status: Never Used   Substance Use Topics   • Alcohol use: Not Currently   • Drug use: Never   [4]  No Known Allergies  [5]  No current facility-administered medications for this visit.    Current Outpatient Medications:   •  apixaban (Eliquis) 5 mg tablet, Take 1 tablet (5 mg) by mouth every 12 hours., Disp: 60 tablet, Rfl: 2    Facility-Administered Medications Ordered in Other Visits:   •  apixaban (Eliquis) tablet 5 mg, 5 mg, oral, q12h, Christopher Barker APRN-CNP, 5 mg at 25 1011  •  lamoTRIgine (LaMICtal) tablet 200 mg, 200 mg, oral, BID, Malcolm Allan MD  •  polyethylene glycol (Glycolax, Miralax) packet 17 g, 17 g, " oral, Daily PRN, Massiel Lewis, APRN-CNP  •  propranolol (Inderal) tablet 20 mg, 20 mg, oral, BID, Madi Williamson DO  [6]  [7]  [8]

## 2025-06-04 NOTE — ANESTHESIA POSTPROCEDURE EVALUATION
Patient: Portillo Barrios    Procedure Summary       Date: 06/04/25 Room / Location: [facility]; [facility]    Anesthesia Start: 1341 Anesthesia Stop: 1415    Procedure: TRANSESOPHAGEAL ECHO (TYRON) W/ POSSIBLE CARDIOVERSION Diagnosis:       Atrial fibrillation with RVR (Multi)      (Atrial Firbillation > 48 hours)    Scheduled Providers: Madi Williamson DO; WON Roman; Robel Fox MD Responsible Provider: Robel Fox MD    Anesthesia Type: MAC ASA Status: 3            Anesthesia Type: MAC    Vitals Value Taken Time   BP  06/04/25 14:18   Temp  06/04/25 14:18   Pulse  06/04/25 14:18   Resp  06/04/25 14:18   SpO2  06/04/25 14:18       Anesthesia Post Evaluation    Patient location during evaluation: bedside  Patient participation: complete - patient participated  Level of consciousness: awake  Pain management: adequate  Airway patency: patent  Cardiovascular status: acceptable  Respiratory status: acceptable  Hydration status: acceptable  Postoperative Nausea and Vomiting: none        No notable events documented.

## 2025-06-04 NOTE — H&P
HPI: Portillo Barrios is a 55 y.o. male, with a PMH of DENG on CPAP, hx of Asthma, Bipolar,  chronic tremor who presented to [facility] ED on 6/3/2025 for palpations and SOB. Pt sates worsening feeling of SOB w/ exertion, up and downs stairs.  Pt states all of this started about a week ago, s/p lipoma removed. Pt states he spoke with PCP and had referral to cardiology, states has an appointment on Thursday. Pt states he never started the propanolol at at was ordered.  Pt states he started noticing HR issues form his I watch states it was alarming him several times a day.  States SOB more w/ exertion or laying down.    Denies any n/v/, fever or chills. No noted edema.    ED Course:   VSS  EKG- A-Fib HR 99  Labs Trop WNL  Heparin initiated,  x1 dose Metoprolol  Chest Xray: No evidence of acute cardiopulmonary process.            Patient History   PMH: He has a past medical history of Anxiety, Asthma, Depression, GI bleed, History of blood transfusion, Pilonidal cyst, Sleep apnea, and Sleep difficulties.  PSH: He has a past surgical history that includes Pilonidal cyst drainage; Appendectomy; Colonoscopy; Upper gastrointestinal endoscopy; and [place] tooth extraction.  SH: He reports that he has never smoked. He has never been exposed to tobacco smoke. He has never used smokeless tobacco. He reports that he does not currently use alcohol. He reports that he does not use drugs.  FH: family history includes Diabetes in his mother; Tremor in his mother and sister; car accident in his father.     RX Allergies[1]  Current Outpatient Medications   Medication Instructions    albuterol 90 mcg/actuation inhaler 2 puffs, inhalation, Every 6 hours PRN    escitalopram (LEXAPRO) 10 mg, Daily    lamoTRIgine (LAMICTAL) 150 mg, 2 times daily    magnesium glycinate 118 mg magnesium capsule 1 capsule, Nightly PRN    multivitamin tablet 1 tablet, Daily    propranolol (INDERAL) 10 mg, 2 times daily    sildenafil (VIAGRA) 50 mg, oral, Daily  "PRN     Review of Systems   ROS: 10-point review of systems was performed and is otherwise negative except as noted in HPI.        Heart Rate:  [84-91]   Temp:  [36.1 °C (97 °F)]   Resp:  [14-20]   BP: ()/(71-82)   Height:  [167.6 cm (5' 6\")]   Weight:  [87.2 kg (192 lb 3.9 oz)]   SpO2:  [91 %-98 %]      0-10 (Numeric) Pain Score: 4   Vitals:    25   Weight: 87.2 kg (192 lb 3.9 oz)        Physical Exam:  Vitals and nursing notes reviewed.  GENERAL: Alert and awake, cooperative; in no acute distress  SKIN: Warm and dry, cap refill <2  HEENT: Normocephalic, PEERL, mucous membranes pink and moist  CARDIAC: irregular   CHEST: Normal respiratory effort, no abnormal breath sounds  ABDOMEN: soft, non-distended, non-tender with palpation  EXTREMITIES: No lower extremity edema, normal pulses all 4 extremities  NEURO: Alert and oriented, mental status at baseline, no focal deficits  PSYCH: Behavior and affect as expected     Medications  Scheduled Medications[2]Continuous Medications[3]PRN Medications[4]    Diagnostic Results   CBC- 6/3/2025:  8:58 PM  8.9 15.9 295    45.8      BMP- 6/3/2025:  8:58 PM  139 19 _ 128   3.5 1.41 22    Estimated Creatinine Clearance: 62.7 mL/min (A) (by C-G formula based on SCr of 1.41 mg/dL (H)).     CA: 9.0 PROTIEN: 6.3 ALT: 29 Total Bili: 1.1 M.98   PHOS: _ ALBUMIN: 4.2 AST: 35   Alk Phos: 45      COAGS- 6/3/2025: 10:02 PM  1.1   11.6 28     CV Labs  Troponin I, High Sensitivity   Date/Time Value Ref Range Status   2025 10:02 PM 7 0 - 20 ng/L Final   2025 08:58 PM 6 0 - 20 ng/L Final     HEMOGLOBIN A1c   Date/Time Value Ref Range Status   2025 10:53 AM 5.8 (H) <5.7 % of total Hgb Final     Comment:     For someone without known diabetes, a hemoglobin   A1c value between 5.7% and 6.4% is consistent with  prediabetes and should be confirmed with a   follow-up test.     For someone with known diabetes, a value <7%  indicates that their diabetes is well " controlled. A1c  targets should be individualized based on duration of  diabetes, age, comorbid conditions, and other  considerations.     This assay result is consistent with an increased risk  of diabetes.     Currently, no consensus exists regarding use of  hemoglobin A1c for diagnosis of diabetes for children.          Hemoglobin A1C   Date/Time Value Ref Range Status   03/12/2024 09:58 AM 5.5 See below % Final   03/09/2023 09:07 AM 5.5 4.0 - 6.0 % Final     Comment:     Hemoglobin A1C levels are related to mean blood glucose during the   preceding 2-3 months. The relationship table below may be used as a   general guide. Each 1% increase in HGB A1C is a reflection of an   increase in mean glucose of approximately 30 mg/dl.   Reference: Diabetes Care, volume 29, supplement 1 Jan. 2006                        HGB A1C ................. Approx. Mean Glucose   _______________________________________________   6%   ...............................  120 mg/dl   7%   ...............................  150 mg/dl   8%   ...............................  180 mg/dl   9%   ...............................  210 mg/dl   10%  ...............................  240 mg/dl  Performed at [address] [address]       LDL-CHOLESTEROL   Date/Time Value Ref Range Status   03/27/2025 10:53 AM 72 mg/dL (calc) Final     Comment:     Reference range: <100     Desirable range <100 mg/dL for primary prevention;    <70 mg/dL for patients with CHD or diabetic patients   with > or = 2 CHD risk factors.     LDL-C is now calculated using the Naif   calculation, which is a validated novel method providing   better accuracy than the Friedewald equation in the   estimation of LDL-C.   Eze BOWIE et al. JACINDA. 2013;310(90): 6628-0473   (http://Konotor.RingMD/faq/RGR448)       LDL Calculated   Date/Time Value Ref Range Status   03/12/2024 09:58 AM 72 65 - 130 mg/dL Final   03/09/2023 09:07 AM 69 65 - 130 MG/DL  Final   03/04/2022 01:03 PM 85 65 - 130 MG/DL Final     HPI: Portillo Barrios is a 55 y.o. male, with a PMH of DENG on CPAP, hx of Asthma, Bipolar,  chronic tremor who presented to [facility] ED on 6/3/2025 for palpations and SOB. Pt sates worsening feeling of SOB w/ exertion, up and downs stairs.  Pt states all of this started about a week ago, s/p lipoma removed. Pt states he spoke with PCP and had referral to cardiology, states has/had an appointment on  this Thursday. Pt states he never started the propanolol at at was ordered.  Pt states he started noticing HR issues form his I watch states it was alarming him several times a day.  States SOB more w/ exertion or laying down.    Denies any n/v/, fever or chills.         Assessment/Plan     New on set A-FIB  - Heparin started  - Propanolol continued  - Cardiology consult  - labs ordered  - ECHO ordered  - NPO until cardiology assesses  - monitor telemetry    DENG on CPAP  - RT consult for setting up home settings    Bipolar  - continue current home medications      GI/VTE PPX: PPI, on Heparin       Chart, medical history, and labs/testing reviewed in detail.   Case and plan of care to be discussed with reviewed by Lead NP.      Disposition: Discharge home  once medically cleared and stable, pending test result and  cardiology  consult    SYLWIA Phillips-CNP   Observation/Internal Med AMANDA  [facility]  06/04/25  12:36 AM  Total time of 60 minutes spent on professional and overall care, with >50% of time dedicated to counseling/coordination of care.           [1] No Known Allergies  [2] escitalopram, 10 mg, oral, Daily  lamoTRIgine, 150 mg, oral, BID  propranolol, 10 mg, oral, BID  [3] heparin, 0-4,500 Units/hr, Last Rate: 1,600 Units/hr (06/03/25 2221)  [4] PRN medications: heparin, polyethylene glycol

## 2025-06-05 ENCOUNTER — APPOINTMENT (OUTPATIENT)
Dept: CARDIOLOGY | Facility: CLINIC | Age: 55
End: 2025-06-05
Payer: COMMERCIAL

## 2025-06-05 ENCOUNTER — PATIENT OUTREACH (OUTPATIENT)
Dept: PRIMARY CARE | Facility: CLINIC | Age: 55
End: 2025-06-05

## 2025-06-05 LAB
ATRIAL RATE: 85 BPM
P AXIS: 45 DEGREES
P OFFSET: 187 MS
P ONSET: 134 MS
PR INTERVAL: 164 MS
Q ONSET: 216 MS
QRS COUNT: 14 BEATS
QRS DURATION: 80 MS
QT INTERVAL: 378 MS
QTC CALCULATION(BAZETT): 449 MS
QTC FREDERICIA: 424 MS
R AXIS: -54 DEGREES
T AXIS: 10 DEGREES
T OFFSET: 405 MS
VENTRICULAR RATE: 85 BPM

## 2025-06-05 NOTE — PROGRESS NOTES
Discharge Facility: [facility]  Discharge Diagnosis: Atrial fibrillation with rapid ventricular response   Admission Date: 06/04/2025  Discharge Date: 06/04/2025    PCP Appointment Date: 06/10/2025, scheduled by this CM  Specialist Appointment Date: Sleep Med 06/13/2025, Cardio 07/15/2025, 07/22/2025  Hospital Encounter and Summary Linked: Yes  ED to Hosp-Admission (Discharged) with Malcolm Allan MD; Izaiah Bess MD (06/03/2025)     See discharge assessment below for further details    Wrap Up  Wrap Up Additional Comments: CM spoke to patient via phone. he states that he is doing okay at home but is still feeling a bit off from the anesthesia. He has his discharge medication at the home. PCP follow up has been scheduled by this CM for 06/10/2025. He was very thankful for this call and assistance. (6/5/2025  9:04 AM)    Medications  Medications reviewed with patient/caregiver?: Yes (6/5/2025  9:04 AM)  Is the patient having any side effects they believe may be caused by any medication additions or changes?: No (6/5/2025  9:04 AM)  Does the patient have all medications ordered at discharge?: Yes (6/5/2025  9:04 AM)  Prescription Comments: Scripts given at discharge for Eliquis (6/5/2025  9:04 AM)  Is the patient taking all medications as directed (includes completed medication regime)?: Yes (6/5/2025  9:04 AM)  Medication Comments: Patient denies any issues obtaining or affording medication (6/5/2025  9:04 AM)    Appointments  Does the patient have a primary care provider?: Yes (6/5/2025  9:04 AM)  Care Management Interventions: Verified appointment date/time/provider (06/10/2025, scheduled by this CM) (6/5/2025  9:04 AM)  Has the patient kept scheduled appointments due by today?: Yes (6/5/2025  9:04 AM)    Self Management  What is the home health agency?: N/A (6/5/2025  9:04 AM)  What Durable Medical Equipment (DME) was ordered?: N/A (6/5/2025  9:04 AM)    Patient Teaching  Does the patient  have access to their discharge instructions?: Yes (6/5/2025  9:04 AM)  Care Management Interventions: Reviewed instructions with patient (6/5/2025  9:04 AM)  What is the patient's perception of their health status since discharge?: Improving (6/5/2025  9:04 AM)  Is the patient/caregiver able to teach back the hierarchy of who to call/visit for symptoms/problems? PCP, Specialist, Home Health nurse, Urgent Care, ED, 911: Yes (6/5/2025  9:04 AM)

## 2025-06-10 ENCOUNTER — APPOINTMENT (OUTPATIENT)
Dept: PRIMARY CARE | Facility: CLINIC | Age: 55
End: 2025-06-10
Payer: COMMERCIAL

## 2025-06-10 VITALS
WEIGHT: 190 LBS | SYSTOLIC BLOOD PRESSURE: 112 MMHG | BODY MASS INDEX: 30.53 KG/M2 | TEMPERATURE: 97.2 F | OXYGEN SATURATION: 97 % | HEART RATE: 77 BPM | DIASTOLIC BLOOD PRESSURE: 70 MMHG | HEIGHT: 66 IN

## 2025-06-10 DIAGNOSIS — I48.0 PAROXYSMAL ATRIAL FIBRILLATION (MULTI): Primary | ICD-10-CM

## 2025-06-10 PROCEDURE — 99214 OFFICE O/P EST MOD 30 MIN: CPT | Performed by: FAMILY MEDICINE

## 2025-06-10 PROCEDURE — 3008F BODY MASS INDEX DOCD: CPT | Performed by: FAMILY MEDICINE

## 2025-06-10 PROCEDURE — 1036F TOBACCO NON-USER: CPT | Performed by: FAMILY MEDICINE

## 2025-06-10 ASSESSMENT — PAIN SCALES - GENERAL: PAINLEVEL_OUTOF10: 0-NO PAIN

## 2025-06-10 NOTE — PROGRESS NOTES
"Subjective   Patient ID: Portillo Barrios is a 55 y.o. male who presents for Follow-up (TCM follow up for Afib.   Patient seen at [facility] 6/3/2025/He says he had a cardioversion and will follow up with cardiology Dr. Iyer 7/22/2025//Still feels some shortness of breath intermittently since the original afib started).    HPI   Here for follow up to Afib.  Pt was seen about a month ago when his smart watch indicated he had Afib.  EKG confirmed this.  Pt sent to Cardiology. And was admitted for Afib with RVR.  While there he was cardioverted.  Pt states he feels fine and has not had any adverse events.  Yet, he has some diminished exertional ability.    Review of Systems  Constitutional: negative for fever, fatigue, weight change.  HEENT:  negative for change in vision, hearing, swallow.  Cardio:  negative for chest pain, LE edema.  Pulm: negative for cough, SOB.    Objective   /70 (BP Location: Left arm, Patient Position: Sitting)   Pulse 77   Temp 36.2 °C (97.2 °F)   Ht 1.676 m (5' 6\")   Wt 86.2 kg (190 lb)   SpO2 97%   BMI 30.67 kg/m²     Physical Exam  General:  awake, alert, in NAD.  HEENT:  moist oral mucosa, no cervical lymphadenopathy.  Cardio:  S1 + S2, no M/R/G.  Pulm: CTA bilaterally.    Assessment/Plan   Problem List Items Addressed This Visit    None  Visit Diagnoses         Codes      Paroxysmal atrial fibrillation (Multi)    -  Primary  Intermittent.  Pt to follow up with Cardiology.   More than 20 minutes was spent with pt in exam room discussing his new condition. I48.0               "

## 2025-06-12 DIAGNOSIS — J45.909 ASTHMA, UNSPECIFIED ASTHMA SEVERITY, UNSPECIFIED WHETHER COMPLICATED, UNSPECIFIED WHETHER PERSISTENT (HHS-HCC): Primary | ICD-10-CM

## 2025-06-12 NOTE — PROGRESS NOTES
[facility]  New Visit Note        HISTORY OF PRESENT ILLNESS     The patient's referring provider is: Cristina Zhu, NORA*    HISTORY OF PRESENT ILLNESS   Portillo Barrios is a 55 y.o. male who presents to a [facility] for a sleep medicine evaluation with concerns of Consult.     PAST SLEEP HISTORY    Patient has the following sleep study results: The patient has a diagnosis of moderate severity sleep apnea with an apnea-hypopnea index (AHI) of 28.5 and SpO2 less than 80% for 14 minutes, based on a sleep study. He has been on CPAP therapy for about a year. His CPAP pressure was initially set from 4 to 20, then changed to 4 to 8, and is currently set from 4 to 16.     CURRENT HISTORY    The patient experiences difficulty falling asleep, possibly related to anxiety. Without CPAP, he snores, gasps, and startles awake frequently. He wakes up multiple times during the night.    He uses nasal pillows and is comfortable with his current mask. He previously tried a full mask and a nasal mask but found them less comfortable.    Having difficulty falling asleep. Thinks he has been having anxiety. Thinks lexapro helps with this. Does nap 20 minutes most days.    Has history of Afib which resolved with cardioversion.    SLEEP RELATED-ROS:    SLEEP SCHEDULE WEEKDAYS/WORKDAYS  Usual Bedtime: 10:30 PM  Falls asleep around: 11:30 PM  Wake time: 5:00 AM    SLEEP SCHEDULE WEEKENDS/NON-WORKDAYS:  Usual Bedtime: 11:00 PM  Falls asleep around: 12:00 AM  Wake time: 7:30 AM    NAPS: Takes a 20-minute nap in the evening.    AVERAGE SLEEP DURATION: 4.5 hours/day    PREFERRED SLEEP POSITION: Side    SLEEP INITIATION: Difficulty falling asleep, possibly due to anxiety.    SLEEP MAINTENANCE: Wakes up frequently during the night.    RECREATIONAL DRUG USE    SMOKING: Never smokes.  ALCOHOL: Never drinks alcohol.  CAFFEINE: Drinks two cups of coffee daily.  MARIJUANA: Never uses  "marijuana.    PAP Adherence:        ESS: 2      PHYSICAL EXAM     VITAL SIGNS: BP 98/68   Pulse 72   Ht 1.676 m (5' 6\")   Wt 86.6 kg (191 lb)   SpO2 97%   BMI 30.83 kg/m²      PREVIOUS WEIGHTS:  Wt Readings from Last 3 Encounters:   06/13/25 86.6 kg (191 lb)   06/10/25 86.2 kg (190 lb)   06/03/25 87.2 kg (192 lb 3.9 oz)       PHYSICAL EXAM: GENERAL: alert oriented x 3 pleasant and cooperative no acute distress  MODIFIED MALLAMPATI SCORE: 2+  LATERAL PHARYNGEAL WALL: 2+  NECK EXAM: normal supple no adenopathy    RESULTS/DATA     No results found for: \"IRON\", \"TRANSFERRIN\", \"IRONSAT\", \"TIBC\", \"FERRITIN\"    ASSESSMENT/PLAN     Mr. Barrios is a 55 y.o. male and was referred to the [facility] for the following issues:    OBSTRUCTIVE SLEEP APNEA  -Benefiting from CPAP  -Fair compliance, DENG well controlled per download  -supplies ordered    DELAYED SLEEP PHASE / CHRONIC INSOMNIA  -Encouraged limiting time in bed - wait until later to get into bed  -avoid napping  -Recommend melatonin 1 mg 4-5 hours prior to intended bedtime  -Try increasing light exposure in the morning and decreasing light exposure in the afternoon    AFIB  -Followed by cardiology    Follow up 3 months   "

## 2025-06-13 ENCOUNTER — APPOINTMENT (OUTPATIENT)
Dept: SLEEP MEDICINE | Facility: CLINIC | Age: 55
End: 2025-06-13
Payer: COMMERCIAL

## 2025-06-13 ENCOUNTER — TELEPHONE (OUTPATIENT)
Dept: PRIMARY CARE | Facility: CLINIC | Age: 55
End: 2025-06-13

## 2025-06-13 ENCOUNTER — APPOINTMENT (OUTPATIENT)
Dept: PRIMARY CARE | Facility: CLINIC | Age: 55
End: 2025-06-13
Payer: COMMERCIAL

## 2025-06-13 VITALS
BODY MASS INDEX: 30.7 KG/M2 | OXYGEN SATURATION: 97 % | HEART RATE: 72 BPM | SYSTOLIC BLOOD PRESSURE: 98 MMHG | WEIGHT: 191 LBS | HEIGHT: 66 IN | DIASTOLIC BLOOD PRESSURE: 68 MMHG

## 2025-06-13 DIAGNOSIS — G47.33 OBSTRUCTIVE SLEEP APNEA: ICD-10-CM

## 2025-06-13 DIAGNOSIS — G47.21 CIRCADIAN RHYTHM SLEEP DISORDER, DELAYED SLEEP PHASE TYPE: ICD-10-CM

## 2025-06-13 DIAGNOSIS — F51.04 CHRONIC INSOMNIA: Primary | ICD-10-CM

## 2025-06-13 DIAGNOSIS — I48.91 ATRIAL FIBRILLATION WITH RVR (MULTI): ICD-10-CM

## 2025-06-13 PROCEDURE — 1036F TOBACCO NON-USER: CPT | Performed by: PHYSICIAN ASSISTANT

## 2025-06-13 PROCEDURE — 99204 OFFICE O/P NEW MOD 45 MIN: CPT | Performed by: PHYSICIAN ASSISTANT

## 2025-06-13 PROCEDURE — 3008F BODY MASS INDEX DOCD: CPT | Performed by: PHYSICIAN ASSISTANT

## 2025-06-13 ASSESSMENT — SLEEP AND FATIGUE QUESTIONNAIRES
ESS-CHAD TOTAL SCORE: 2
HOW LIKELY ARE YOU TO NOD OFF OR FALL ASLEEP WHILE SITTING QUIETLY AFTER LUNCH WITHOUT ALCOHOL: WOULD NEVER DOZE
HOW LIKELY ARE YOU TO NOD OFF OR FALL ASLEEP WHILE WATCHING TV: MODERATE CHANCE OF DOZING
HOW LIKELY ARE YOU TO NOD OFF OR FALL ASLEEP WHILE SITTING AND READING: WOULD NEVER DOZE
HOW LIKELY ARE YOU TO NOD OFF OR FALL ASLEEP WHEN YOU ARE A PASSENGER IN A CAR FOR AN HOUR WITHOUT A BREAK: WOULD NEVER DOZE
HOW LIKELY ARE YOU TO NOD OFF OR FALL ASLEEP WHILE SITTING AND TALKING TO SOMEONE: WOULD NEVER DOZE
SITING INACTIVE IN A PUBLIC PLACE LIKE A CLASS ROOM OR A MOVIE THEATER: WOULD NEVER DOZE
HOW LIKELY ARE YOU TO NOD OFF OR FALL ASLEEP IN A CAR, WHILE STOPPED FOR A FEW MINUTES IN TRAFFIC: WOULD NEVER DOZE
HOW LIKELY ARE YOU TO NOD OFF OR FALL ASLEEP WHILE LYING DOWN TO REST IN THE AFTERNOON WHEN CIRCUMSTANCES PERMIT: WOULD NEVER DOZE

## 2025-06-13 ASSESSMENT — LIFESTYLE VARIABLES: HOW MANY STANDARD DRINKS CONTAINING ALCOHOL DO YOU HAVE ON A TYPICAL DAY: PATIENT DOES NOT DRINK

## 2025-06-13 ASSESSMENT — PAIN SCALES - GENERAL: PAINLEVEL_OUTOF10: 0-NO PAIN

## 2025-06-13 NOTE — TELEPHONE ENCOUNTER
Spoke to patient.  Advised him to cut the propranolol dose in 1/2 and take 1/2 tablet daily per MJM and see if that helps with symptoms.  He will call the office back in a couple of days if symptoms persist.

## 2025-06-13 NOTE — TELEPHONE ENCOUNTER
Every time patient takes his Propranolol he gets SOB about a half an hour after he takes it. It does subside after about an hour.

## 2025-06-13 NOTE — PATIENT INSTRUCTIONS
Good seeing you today,    Try waiting until later to get into bed.  Try to avoid napping as this can make you less sleepy.  Continue magnesium to help you fall asleep.    Continue using CPAP nightly.    I recommend you start melatonin 1 mg 4-5 hours prior to intended bedtime. Increase the amount of light you get in the morning and avoid light as much as possible in the evening (at least 4 hours before you want to go to bed).    We can consider a medication like lexapro or paxil to help with anxiety.    Below is more information about insomnia:    Trouble falling asleep or trouble staying asleep is called INSOMNIA and it can be caused by many different things such as untreated sleep apnea, anxiety, depression, stress, poor sleep habits, and other medical conditions or medications. The best way to treat insomnia is to treat the cause. In general, we can all benefit from better sleep hygiene (also known as good sleep habits). Below are recommendations to help you improve your sleep so you can fall asleep, stay asleep, and wake up feeling refreshed.    Keep a routine bedtime and rise time even on weekends and non-work days. This helps your biological clock stay in sync with your sleep needs. If you currently have variable sleep-wake schedule, start off by waking up and getting out of bed the same time every day, even on weekends or non-work days. Whether you have a good or poor sleep the night before, waking up at the same time every day can help re-train and reset your body clock.  Go to bed when you are sleepy and not when tired nor before your goal bedtime. Long periods of time in bed will lead to fragmented, shallow, broken sleep.   Use the bed for sleep and intimacy only. Do not watch TV, eat, read or use phone/laptop in bed. Keeping sleep as the only activity in bed will help re-associate bed equals sleep.  Get up when you cannot sleep in 15-20 minutes. When you are unable to sleep, exit the bed, and go to another  "room or chair in bedroom, do something boring, calm, relaxing, distracting, or non-stimulating in dim lighting until you feel sleepy enough to fall back asleep. Avoid stimulating activity or any triggers for mental thinking (e.g. cellphone). Repeat as needed.  Avoid napping during the day to build up sleep pressure so that it won't be hard for you to fall asleep at night. Napping, particularly in the late afternoon or early evening may interfere with your night's sleep. If naps are necessary, limit naps under 15-20 minutes and not later than 3 PM.   Create a \"buffer zone\" or \"wind-down time\". This is a quiet time prior to bedtime, typically at least 30 minutes and perhaps as long as 1-2 hours. During this time, you should do things that are enjoyable, relaxing, and not necessarily goal-oriented like performing relaxation exercises, listening to relaxing music, reading a boring book, dimming the lights, or eating a light bedtime snack like a glass of milk and banana which can promote sleep. Activities that promote relaxation before sleep is important because these can help quiet the mind and relax the body to get into the right state for sleep.   Do not worry or plan in bed. If you are worrying, planning, feeling anxious, or cannot shut off your thoughts, get up and stay out of bed until you have quieted your mind. Set up a “scheduled worry time” in the morning or late afternoon to write down your worries and stressors as well as plans for the following day.   Keep your bedroom quiet, dark, and cool. Ideal sleeping temperature is 65F. If light bothers you, get a slumber mask or blackout shades. If noise bothers you, put ear plugs or get a white noise machine. Alternatively, you may turn on fan or humidifier to create a constant background noise to eliminate unexpected sounds that would otherwise wake you up in the middle of your sleep.  Keep your bedroom technology free. Avoid exposure to TV and electronics 1-2 hours " "prior to bedtime. May also consider wearing \"blue light blocker\" eyeglasses.  Turn the clock around to avoid clock-watching. Thoughts of the time can cause frustration and make it more difficult to go to sleep.   Other things to avoid to help   Avoid  caffeine (including chocolate) intake in the late afternoon and early evening. Limit the amount of caffeine and consume before noon.   Avoid smoking 2-3 hours before bedtime. Like caffeine, nicotine in cigarette smoking acts a stimulant.   Avoid alcohol intake 2-3 hours before bedtime. Although alcohol may seem to help you fall asleep more easily as it slows down brain activity, it also disrupts your sleep during the night by causing frequent awakenings as the effect of alcohol wears off. Additionally, alcohol worsens snoring and sleep apnea  Avoid eating a heavy meal (high-fat, high-carbohydrate, gas-producing foods) at dinner and 2-3 hours before bedtime.    Avoid drinking more than 8 oz liquids in the evening. Limit fluid intake at 8 oz during dinner. Restrict fluids after dinner. May take sips of water enough to swallow bedtime medications. Void at bedtime.  Avoid physical exercise or hot shower/bath within 2 hours of bedtime. Regular exercise can improve sleep quality, but exercising or having a hot shower/bath too close to bedtime can disrupt sleep onset. The best time to exercise to help sleep is in the late afternoon or early evening but not within 2 hours of bedtime. In order to promote sleep, hot shower/bath can be taken in the evening for 15-20 minutes but not within 2 hours of going to bed.   Avoid sleeping with pets or kids if they appear to bother your sleep and/or sleep quality.  Last but the least, DO NOT TRY TOO HARD TO SLEEP. JUST ALLOW SLEEP TO UNFOLD.    MOST IMPORTANTLY:  BE PATIENT!  BE CONSISTENT!  Your sleep problem developed over time so it will take some time and consistency to return to a more normal sleep pattern. By following the " suggestions listed above, you should see gradual sleep improvements over time.    Also you have been recommended to do Cognitive Behavioral Therapy for Insomnia (CBT-I). CBT-I is widely recognized as an effective treatment for a wide range of insomnias. CBT-I is typically made up of a number of components including assessment, behavioral and cognitive interventions, motivational techniques, and relapse prevention skills. An overwhelming amount of evidence suggests that CBT-I is as effective as hypnotics and the newer non-benzodiazepine sleep aides in the acute treatment and is more effective than medication in the long term treatment of insomnia.     Below are some resources for CBT-I:  To see a Behavioral Sleep Medicine specialist for one-on-one counseling  CBT-I at the [facility] - Dr. Ashutosh Murry PsyD or Dr. Vandana Haro, PhD - Phone: [phone]  Fax: [phone]. There may be a several month waiting period.  CBT-I at [facility] - Mena Sheldon PsyD (Call [phone] and speak with Josephine Allen  Fax: [phone] or backup fax: [phone])  Dr. Joy Rudolph - [email or web address]  - She only does telemedicine. She was formerly part of  but is in private practice and would be out-of-network  Dr. Prieto - one on one CBT-I service www.appMobi. You may have to pay out of pocket and submit visits to your insurance for reimbursement.  Other BSM providers in [place]:  https://www.behavioralsleep.org/index.php/directory/browse-by/state?value=[place]    2. If doing CBTi using an online platform, there are several online platforms that are good resources, but may vary based on cost. These include:  Abner- online course via [facility]. Self-guided. One time charge to sign up: Abner ($40)  The SleepReset - online guided cognitive behavioral therapy https://www.Promon.GeoLearning/pricing ($300 for 8 weeks)  Sleepio - https://www.sleepio.com/sleepio/welcomeusint/354#1/1 (Some insurances or  employers may cover - check with your HR Benefits office)  SleepEZ- Free self-guided course from the [facility] https://www.veterantraining.va.gov/insomnia/    Follow up 2 months

## 2025-06-13 NOTE — TELEPHONE ENCOUNTER
Tried calling patient rang then sounded like someone picked up the phone but then went to a busy signal. Will try again later.

## 2025-06-14 LAB
Q ONSET: 215 MS
QRS COUNT: 17 BEATS
QRS DURATION: 82 MS
QT INTERVAL: 348 MS
QTC CALCULATION(BAZETT): 446 MS
QTC FREDERICIA: 411 MS
R AXIS: -42 DEGREES
T AXIS: 4 DEGREES
T OFFSET: 389 MS
VENTRICULAR RATE: 99 BPM

## 2025-06-16 ENCOUNTER — OFFICE VISIT (OUTPATIENT)
Dept: PRIMARY CARE | Facility: CLINIC | Age: 55
End: 2025-06-16
Payer: COMMERCIAL

## 2025-06-16 ENCOUNTER — PATIENT OUTREACH (OUTPATIENT)
Dept: PRIMARY CARE | Facility: CLINIC | Age: 55
End: 2025-06-16

## 2025-06-16 VITALS
DIASTOLIC BLOOD PRESSURE: 74 MMHG | HEART RATE: 76 BPM | BODY MASS INDEX: 31.18 KG/M2 | OXYGEN SATURATION: 97 % | WEIGHT: 194 LBS | TEMPERATURE: 97.4 F | SYSTOLIC BLOOD PRESSURE: 122 MMHG | HEIGHT: 66 IN

## 2025-06-16 DIAGNOSIS — I48.0 PAROXYSMAL ATRIAL FIBRILLATION (MULTI): Primary | ICD-10-CM

## 2025-06-16 PROCEDURE — 3008F BODY MASS INDEX DOCD: CPT | Performed by: FAMILY MEDICINE

## 2025-06-16 PROCEDURE — 99213 OFFICE O/P EST LOW 20 MIN: CPT | Performed by: FAMILY MEDICINE

## 2025-06-16 PROCEDURE — 1036F TOBACCO NON-USER: CPT | Performed by: FAMILY MEDICINE

## 2025-06-16 ASSESSMENT — PAIN SCALES - GENERAL: PAINLEVEL_OUTOF10: 0-NO PAIN

## 2025-06-16 NOTE — PROGRESS NOTES
"Subjective   Patient ID: Portillo Barrios is a 55 y.o. male who presents for Follow-up (Patient feels shortness of breath after taking his Propranolol.   This feeling usually lasts 1-2 hours after taking the medication.  He was advised on 6/13/2025 per MJM to take half the dose but he is still having the same symptoms.).    HPI here for follow-up to shortness of breath.  Patient was seen just a few days ago.  He has a recent diagnosis of atrial fibrillation and was cardioverted.  His cardiologist put him on propranolol 20 mg twice daily.  He states that as soon as he takes the medication he gets bouts of shortness of breath and an uneasy feeling.  I last office visit a few days ago he was changed to half tablet twice daily (10 mg twice daily).  Today repeat patient returns stating that although the symptoms improved with the reduced dose they did not go away.  In spite of all this patient is still able to exercise and rides his bike on a daily basis.  Patient has a smart watch which has not indicated atrial fibrillation.  Review of Systems  Constitutional: Patient is negative for fever, fatigue, weight change.  HEENT: Patient is negative for change in vision, hearing, swallow.  Cardio: Patient is negative for chest pain, lower extremity edema.  Pulmonary: Patient is positive for shortness of breath.  He is negative for cough.    Objective   /74 (BP Location: Left arm, Patient Position: Sitting)   Pulse 76   Temp 36.3 °C (97.4 °F)   Ht 1.676 m (5' 6\")   Wt 88 kg (194 lb)   SpO2 97%   BMI 31.31 kg/m²     Physical Exam  General: Awake and alert no apparent distress.  HEENT: Moist oral mucosa no cervical lymphadenopathy.  Cardio: Heart S1-S2 no murmur rub or gallop.  Pulmonary: Lungs clear to auscultation bilaterally.  Assessment/Plan   Problem List Items Addressed This Visit    None  Visit Diagnoses         Codes      Paroxysmal atrial fibrillation (Multi)    -  Primary needs better control.  Reduce " propranolol to 20 mg, 1/4 tablet twice daily (5 mg twice daily).  Patient will follow-up here in 2 weeks. I48.0    Relevant Orders    Follow Up In Primary Care - Established

## 2025-06-24 ENCOUNTER — HOSPITAL ENCOUNTER (EMERGENCY)
Facility: HOSPITAL | Age: 55
Discharge: HOME | End: 2025-06-24
Attending: EMERGENCY MEDICINE
Payer: COMMERCIAL

## 2025-06-24 ENCOUNTER — APPOINTMENT (OUTPATIENT)
Dept: RADIOLOGY | Facility: HOSPITAL | Age: 55
End: 2025-06-24
Payer: COMMERCIAL

## 2025-06-24 ENCOUNTER — APPOINTMENT (OUTPATIENT)
Dept: CARDIOLOGY | Facility: HOSPITAL | Age: 55
End: 2025-06-24
Payer: COMMERCIAL

## 2025-06-24 VITALS
SYSTOLIC BLOOD PRESSURE: 117 MMHG | WEIGHT: 190 LBS | BODY MASS INDEX: 30.53 KG/M2 | RESPIRATION RATE: 14 BRPM | DIASTOLIC BLOOD PRESSURE: 81 MMHG | TEMPERATURE: 97.9 F | HEART RATE: 68 BPM | OXYGEN SATURATION: 95 % | HEIGHT: 66 IN

## 2025-06-24 DIAGNOSIS — R06.02 SHORTNESS OF BREATH: Primary | ICD-10-CM

## 2025-06-24 DIAGNOSIS — R20.2 PARESTHESIA OF ARM: ICD-10-CM

## 2025-06-24 LAB
ALBUMIN SERPL BCP-MCNC: 4.2 G/DL (ref 3.4–5)
ALP SERPL-CCNC: 45 U/L (ref 33–120)
ALT SERPL W P-5'-P-CCNC: 32 U/L (ref 10–52)
ANION GAP SERPL CALC-SCNC: 13 MMOL/L (ref 10–20)
AST SERPL W P-5'-P-CCNC: 25 U/L (ref 9–39)
ATRIAL RATE: 69 BPM
BASOPHILS # BLD AUTO: 0.08 X10*3/UL (ref 0–0.1)
BASOPHILS NFR BLD AUTO: 1 %
BILIRUB SERPL-MCNC: 1.2 MG/DL (ref 0–1.2)
BUN SERPL-MCNC: 16 MG/DL (ref 6–23)
CALCIUM SERPL-MCNC: 9.4 MG/DL (ref 8.6–10.3)
CARDIAC TROPONIN I PNL SERPL HS: 4 NG/L (ref 0–20)
CARDIAC TROPONIN I PNL SERPL HS: 4 NG/L (ref 0–20)
CHLORIDE SERPL-SCNC: 106 MMOL/L (ref 98–107)
CO2 SERPL-SCNC: 23 MMOL/L (ref 21–32)
CREAT SERPL-MCNC: 1.46 MG/DL (ref 0.5–1.3)
EGFRCR SERPLBLD CKD-EPI 2021: 57 ML/MIN/1.73M*2
EOSINOPHIL # BLD AUTO: 0.21 X10*3/UL (ref 0–0.7)
EOSINOPHIL NFR BLD AUTO: 2.5 %
ERYTHROCYTE [DISTWIDTH] IN BLOOD BY AUTOMATED COUNT: 12.9 % (ref 11.5–14.5)
GLUCOSE SERPL-MCNC: 91 MG/DL (ref 74–99)
HCT VFR BLD AUTO: 47.1 % (ref 41–52)
HGB BLD-MCNC: 16.7 G/DL (ref 13.5–17.5)
IMM GRANULOCYTES # BLD AUTO: 0.05 X10*3/UL (ref 0–0.7)
IMM GRANULOCYTES NFR BLD AUTO: 0.6 % (ref 0–0.9)
LYMPHOCYTES # BLD AUTO: 2.05 X10*3/UL (ref 1.2–4.8)
LYMPHOCYTES NFR BLD AUTO: 24.4 %
MAGNESIUM SERPL-MCNC: 2.04 MG/DL (ref 1.6–2.4)
MCH RBC QN AUTO: 31.7 PG (ref 26–34)
MCHC RBC AUTO-ENTMCNC: 35.5 G/DL (ref 32–36)
MCV RBC AUTO: 90 FL (ref 80–100)
MONOCYTES # BLD AUTO: 0.76 X10*3/UL (ref 0.1–1)
MONOCYTES NFR BLD AUTO: 9.1 %
NEUTROPHILS # BLD AUTO: 5.24 X10*3/UL (ref 1.2–7.7)
NEUTROPHILS NFR BLD AUTO: 62.4 %
NRBC BLD-RTO: 0 /100 WBCS (ref 0–0)
P AXIS: 46 DEGREES
P OFFSET: 180 MS
P ONSET: 134 MS
PLATELET # BLD AUTO: 259 X10*3/UL (ref 150–450)
POTASSIUM SERPL-SCNC: 4.5 MMOL/L (ref 3.5–5.3)
PR INTERVAL: 162 MS
PROT SERPL-MCNC: 6.5 G/DL (ref 6.4–8.2)
Q ONSET: 215 MS
QRS COUNT: 11 BEATS
QRS DURATION: 80 MS
QT INTERVAL: 378 MS
QTC CALCULATION(BAZETT): 405 MS
QTC FREDERICIA: 396 MS
R AXIS: -25 DEGREES
RBC # BLD AUTO: 5.26 X10*6/UL (ref 4.5–5.9)
SODIUM SERPL-SCNC: 137 MMOL/L (ref 136–145)
T AXIS: 28 DEGREES
T OFFSET: 404 MS
VENTRICULAR RATE: 69 BPM
WBC # BLD AUTO: 8.4 X10*3/UL (ref 4.4–11.3)

## 2025-06-24 PROCEDURE — 99285 EMERGENCY DEPT VISIT HI MDM: CPT | Mod: 25 | Performed by: EMERGENCY MEDICINE

## 2025-06-24 PROCEDURE — 71046 X-RAY EXAM CHEST 2 VIEWS: CPT | Performed by: RADIOLOGY

## 2025-06-24 PROCEDURE — 36415 COLL VENOUS BLD VENIPUNCTURE: CPT | Performed by: EMERGENCY MEDICINE

## 2025-06-24 PROCEDURE — 80053 COMPREHEN METABOLIC PANEL: CPT | Performed by: EMERGENCY MEDICINE

## 2025-06-24 PROCEDURE — 70450 CT HEAD/BRAIN W/O DYE: CPT | Performed by: RADIOLOGY

## 2025-06-24 PROCEDURE — 93005 ELECTROCARDIOGRAM TRACING: CPT

## 2025-06-24 PROCEDURE — 84484 ASSAY OF TROPONIN QUANT: CPT | Performed by: EMERGENCY MEDICINE

## 2025-06-24 PROCEDURE — 71046 X-RAY EXAM CHEST 2 VIEWS: CPT

## 2025-06-24 PROCEDURE — 70450 CT HEAD/BRAIN W/O DYE: CPT

## 2025-06-24 PROCEDURE — 85025 COMPLETE CBC W/AUTO DIFF WBC: CPT | Performed by: EMERGENCY MEDICINE

## 2025-06-24 PROCEDURE — 83735 ASSAY OF MAGNESIUM: CPT | Performed by: EMERGENCY MEDICINE

## 2025-06-24 ASSESSMENT — PAIN - FUNCTIONAL ASSESSMENT
PAIN_FUNCTIONAL_ASSESSMENT: 0-10
PAIN_FUNCTIONAL_ASSESSMENT: 0-10

## 2025-06-24 ASSESSMENT — PAIN SCALES - GENERAL
PAINLEVEL_OUTOF10: 0 - NO PAIN

## 2025-06-24 ASSESSMENT — PAIN DESCRIPTION - PROGRESSION: CLINICAL_PROGRESSION: NOT CHANGED

## 2025-06-24 NOTE — ED PROVIDER NOTES
Emergency Department Provider Note             History of Present Illness   CC: Shortness of Breath, Nausea, and Tingling (Left arm)    History provided by: Patient  Limitations to History: None  External Records Reviewed: prior ED provider notes, clinic notes, discharge summaries    HPI:  Portillo Barrios is a 55 y.o. male with recently diagnosed atrial fibrillation s/p cardioversion 6/3, now on eliquis, essential tremor, DENG, asthma, anxiety presenting to the emergency department for intermittent shortness of breath, left upper extremity paresthesia, and nausea. States his paresthesias started a few weeks ago but have been occuring more frequently. Notes it's particularly worse when walking rather than when sitting or lying still. His dyspnea and nausea are worse after taking his propanolol and eliquis so his PCP recently decreased his propanolol dose. Despite this, he felt his symptoms were worse today. He also reports a headache last night but not currently.  He denies fever, chills, palpitations, chest pain, weakness, lower extremity paresthesia, neck pain, vision changes, vomiting, abdominal pain.     Physical Exam   Triage vitals:  T 36.6 °C (97.9 °F)  HR 70  /69  RR 18  O2 97 % None (Room air)    General: awake, well-appearing, no distress  Head: normocephalic, atraumatic  Eyes: pupils equal, extraocular movements grossly intact, no conjunctival injection or scleral icterus  ENT: nares patent, moist mucous membranes  Neck: supple, trachea midline, no masses  CV: regular rate and rhythm, well-perfused  Resp: breathing is non-labored, speaking in full sentences. Lungs are clear to auscultation bilaterally  GI: soft, non-distended, non-tender, no rebound or guarding  Extremities: no edema, no gross deformity   Neuro: alert, fully oriented, follows commands, no aphasia, speech is fluent. Visual acuity grossly intact, visual fields full, extraocular movements intact. Facial sensation is intact, no facial  asymmetry. Hearing is grossly intact. 5/5 strength in deltoids, biceps, triceps, hand , hip flexion, knee extension, dorsiflexion, plantarflexion.  Sensation is intact to light touch and is symmetric. No nystagmus, no dysmetria. Gait normal without ataxia. Language and comprehension are intact.    Psych: Appropriate mood and affect    ED Course & Medical Decision Making     55 y.o. male with recently diagnosed atrial fibrillation s/p cardioversion 6/3, now on eliquis, essential tremor, DENG, asthma, anxiety presenting to the emergency department for intermittent shortness of breath, intermittent left upper extremity paresthesia, and nausea.  He is well-appearing, no distress.  Vitals are within normal limits.  EKG shows sinus rhythm without ischemic changes. He reports mild paresthesias which are subjective. Otherwise denies symptoms at this time. No neuro deficits appreciated on exam, NIHSS is 0. See ED course for details.     Social Determinants Limiting Care: None identified    EKG: See ED course.     Results: Independently reviewed and interpreted by me. Please see ED course and [facility] for my full interpretation.     Chronic Medical Conditions Significantly Affecting Care: as per [facility]    Patient was discussed with the following consultants/services: None     Care Considerations: as per [facility]    ED Course as of 06/24/25 1916   Tue Jun 24, 202524, 2025 1752 Labs are unremarkable.  Troponin is within normal limits.  I independently reviewed her chest x-ray which shows no acute process.  CT head was obtained given his worsening left upper extremity paresthesia though subjective-this shows no acute process.  There are 2 incidental cysts in the maxillary sinus. [LM]   1914 EKG per my interpretation reveals normal sinus rhythm, rate 69, left axis deviation, normal intervals, no ST elevation/depression or significant T wave abnormalities [LM]   1914 Repeat troponin is normal.    [LM]   1914 On revaluation, he denies any symptoms  currently. Results were discussed and he feels comfortable with discharge. Has PCP follow up next week and cardiology follow up in a few weeks. He was given strict return precautions.  [LM]      ED Course User Index  [LM] Treasure Holley MD         Diagnoses as of 06/24/25 1916   Shortness of breath   Paresthesia of arm       Disposition   Discharge    MD Treasure Martinez MD  06/27/25 3019

## 2025-06-24 NOTE — DISCHARGE INSTRUCTIONS
You were seen in the emergency department for shortness of breath, tingling in your left arm, nausea. Your symptoms improved while in the ED and the cause for these symptoms is not entirely clear.  Your EKG, labs, imaging were reassuring and we feel that it is safe for you to go home.  Monitor symptoms closely.  Follow-up with your doctors as scheduled.  Return to the emergency department with new or worsening symptoms.

## 2025-06-24 NOTE — ED NOTES
Report given to [facility]. No significant detrimental changes prior to handoff. Neuro/skin/respiratory grossly WDL.      Chad Funk RN  06/24/25 1922

## 2025-06-24 NOTE — ED TRIAGE NOTES
Patient presents to the ED by POV from home for shortness of breath, nausea, and left arm tingling. Patient reports the left arm tingling has been intermittent for the last couple weeks but the SOB and nausea started today. Patient denies CP. H/O cardioversion for Afib w/RVR on 6/3.

## 2025-06-27 PROCEDURE — RXMED WILLOW AMBULATORY MEDICATION CHARGE

## 2025-06-30 ENCOUNTER — APPOINTMENT (OUTPATIENT)
Dept: PRIMARY CARE | Facility: CLINIC | Age: 55
End: 2025-06-30
Payer: COMMERCIAL

## 2025-06-30 VITALS
SYSTOLIC BLOOD PRESSURE: 117 MMHG | OXYGEN SATURATION: 96 % | HEART RATE: 77 BPM | TEMPERATURE: 98 F | WEIGHT: 192 LBS | DIASTOLIC BLOOD PRESSURE: 74 MMHG | BODY MASS INDEX: 30.86 KG/M2 | HEIGHT: 66 IN

## 2025-06-30 DIAGNOSIS — R06.00 DYSPNEA, UNSPECIFIED TYPE: Primary | ICD-10-CM

## 2025-06-30 DIAGNOSIS — I48.0 PAROXYSMAL ATRIAL FIBRILLATION (MULTI): ICD-10-CM

## 2025-06-30 PROCEDURE — 1036F TOBACCO NON-USER: CPT | Performed by: FAMILY MEDICINE

## 2025-06-30 PROCEDURE — 3008F BODY MASS INDEX DOCD: CPT | Performed by: FAMILY MEDICINE

## 2025-06-30 PROCEDURE — 99213 OFFICE O/P EST LOW 20 MIN: CPT | Performed by: FAMILY MEDICINE

## 2025-06-30 NOTE — LETTER
June 30, 2025     Patient: Portillo Barrios   YOB: 1970   Date of Visit: 6/30/2025       To Whom It May Concern:    Portillo Barrios was seen in my clinic on 6/30/2025 at 3:30 pm. Please excuse Portillo for his absence from work on this day to make the appointment.    If you have any questions or concerns, please don't hesitate to call.         Sincerely,         Juaquin Zhang MD        CC: No Recipients

## 2025-06-30 NOTE — PROGRESS NOTES
"Subjective   Patient ID: Portillo Barrios is a 55 y.o. male who presents for ER Follow-up (Pt went to er on 6/24 for SOB and tingling on left arm /Pt thinks beta-blocker is causing SOB ).    HPI here for follow-up to A-fib.  Patient was seen recently in follow-up to his cardioversion.  He has not had any recent events that he can feel.  His smart watch is also not detected any new A-fib.  However patient does have the continued complaint of shortness of breath and dyspnea.  It was thought that it may have been part of use of his propranolol and so he was asked to reduce the propranolol dosage.  Patient did this and that did not effectively change his shortness of breath, so he went back to the original dosing schedule as prescribed by his cardiologist.  He will be seeing cardiology in about 2 weeks.   Patient complains of the shortness of breath but he regularly bicycles 20 to 25 miles at a time.  He cycled yesterday.  Review of Systems  Constitutional: Patient is negative for fever, fatigue, weight change.  HEENT: Patient is negative for change in vision, hearing, swallow.  Cardio: Patient is negative for chest pain or lower extremity edema.  Pulmonary: Patient is positive for perceived dyspnea.  Objective   /74 (BP Location: Left arm, Patient Position: Sitting, BP Cuff Size: Adult)   Pulse 77   Temp 36.7 °C (98 °F) (Temporal)   Ht 1.676 m (5' 6\")   Wt 87.1 kg (192 lb)   SpO2 96%   BMI 30.99 kg/m²     Physical Exam  General: Awake and alert no apparent distress.  HEENT: Moist oral mucosa no cervical lymphadenopathy.  Cardio: Heart S1-S2 no murmur rub or gallop.  Pulmonary: Lungs clear to auscultation bilaterally.    Assessment/Plan   Problem List Items Addressed This Visit    None  Visit Diagnoses         Codes      Dyspnea, unspecified type    -  Primary needs better control.  Will have patient see cardiology in 2 weeks. R06.00      Paroxysmal atrial fibrillation (Multi)    in remission. I48.0      "

## 2025-07-03 ENCOUNTER — PHARMACY VISIT (OUTPATIENT)
Dept: PHARMACY | Facility: CLINIC | Age: 55
End: 2025-07-03
Payer: MEDICARE

## 2025-07-08 LAB — BODY SURFACE AREA: 2.01 M2

## 2025-07-08 PROCEDURE — 93248 EXT ECG>7D<15D REV&INTERPJ: CPT | Performed by: INTERNAL MEDICINE

## 2025-07-15 ENCOUNTER — OFFICE VISIT (OUTPATIENT)
Facility: CLINIC | Age: 55
End: 2025-07-15
Payer: COMMERCIAL

## 2025-07-15 VITALS
HEART RATE: 67 BPM | WEIGHT: 191 LBS | DIASTOLIC BLOOD PRESSURE: 80 MMHG | OXYGEN SATURATION: 96 % | BODY MASS INDEX: 30.83 KG/M2 | SYSTOLIC BLOOD PRESSURE: 118 MMHG

## 2025-07-15 DIAGNOSIS — I20.89 ANGINAL EQUIVALENT: Primary | ICD-10-CM

## 2025-07-15 DIAGNOSIS — I48.91 ATRIAL FIBRILLATION WITH RVR (MULTI): ICD-10-CM

## 2025-07-15 DIAGNOSIS — I48.91 ATRIAL FIBRILLATION WITH RAPID VENTRICULAR RESPONSE (MULTI): ICD-10-CM

## 2025-07-15 PROCEDURE — 99212 OFFICE O/P EST SF 10 MIN: CPT

## 2025-07-15 PROCEDURE — 99214 OFFICE O/P EST MOD 30 MIN: CPT | Performed by: INTERNAL MEDICINE

## 2025-07-15 PROCEDURE — 1036F TOBACCO NON-USER: CPT | Performed by: INTERNAL MEDICINE

## 2025-07-15 RX ORDER — METOPROLOL TARTRATE 50 MG/1
50 TABLET ORAL 2 TIMES DAILY
Qty: 60 TABLET | Refills: 11 | Status: SHIPPED | OUTPATIENT
Start: 2025-07-15 | End: 2025-07-15

## 2025-07-15 RX ORDER — METOPROLOL TARTRATE 25 MG/1
25 TABLET, FILM COATED ORAL 2 TIMES DAILY
Qty: 60 TABLET | Refills: 11 | Status: SHIPPED | OUTPATIENT
Start: 2025-07-15 | End: 2026-07-15

## 2025-07-15 RX ORDER — PROPRANOLOL HYDROCHLORIDE 20 MG/1
20 TABLET ORAL DAILY PRN
Qty: 60 TABLET | Refills: 1 | Status: SHIPPED | OUTPATIENT
Start: 2025-07-15 | End: 2025-09-13

## 2025-07-15 ASSESSMENT — ENCOUNTER SYMPTOMS
COUGH: 0
LOSS OF SENSATION IN FEET: 0
SYNCOPE: 0
MYALGIAS: 0
WEIGHT GAIN: 0
SHORTNESS OF BREATH: 0
OCCASIONAL FEELINGS OF UNSTEADINESS: 0
WHEEZING: 0
WEIGHT LOSS: 0
DIAPHORESIS: 0
DEPRESSION: 0
DYSPNEA ON EXERTION: 1
PALPITATIONS: 0
PND: 0
CLAUDICATION: 0
IRREGULAR HEARTBEAT: 0
NEAR-SYNCOPE: 0
FEVER: 0
DIZZINESS: 0
WEAKNESS: 0
ORTHOPNEA: 0

## 2025-07-15 ASSESSMENT — PAIN SCALES - GENERAL: PAINLEVEL_OUTOF10: 0-NO PAIN

## 2025-07-15 ASSESSMENT — LIFESTYLE VARIABLES: TOTAL SCORE: 0

## 2025-07-15 NOTE — LETTER
July 15, 2025     Patient: Portillo Barrios   YOB: 1970   Date of Visit: 7/15/2025       To Whom It May Concern:    Portillo Barrios was seen in my clinic on 7/15/2025 at 8:45 am. Please excuse Portillo for his absence from work on this day to make the appointment.    If you have any questions or concerns, please don't hesitate to call.         Sincerely,         Coleman Maharaj,         CC: No Recipients

## 2025-07-15 NOTE — ASSESSMENT & PLAN NOTE
All EKGs and monitor results were reviewed.  He uses propranolol rather infrequently, a couple of times a year for tremors this was prior to his diagnosis of atrial fibrillation.  I am going to have him go back to using propranolol infrequently.  I am starting him on metoprolol for now twice daily.  I have instructed him to take 2 pills, 50 mg prior to his coronary CT only.  Will continue Eliquis for now but plan on discontinuing especially if his coronary CT fails to identify atherosclerosis.

## 2025-07-15 NOTE — PROGRESS NOTES
Subjective      Chief Complaint   Patient presents with    cardiac eval    Shortness of Breath        55-year-old male presents for cardiac evaluation.  He has a history of obstructive sleep apnea on CPAP, asthma.  He was diagnosed with atrial fibrillation in May 2025 and presented to the hospital with symptomatic atrial fibrillation with rapid ventricular response in June 2025.  He had an echocardiogram showing a low normal ejection fraction of 50%.  Left atrial size was normal and there were trivial valvular abnormalities.  He was anticoagulated with Eliquis and underwent successful TYRON guided cardioversion.  Plan was for him to wear a Holter monitor which showed sinus rhythm with rare PVCs and PACs.  There was 1 episode of atrial tachycardia.  He tells me that even without atrial fibrillation he is having significant dyspnea on exertion relieved with rest.  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           Review of Systems   Constitutional: Negative for diaphoresis, fever, weight gain and weight loss.   Eyes:  Negative for visual disturbance.   Cardiovascular:  Positive for dyspnea on exertion. Negative for chest pain, claudication, irregular heartbeat, leg swelling, near-syncope, orthopnea, palpitations, paroxysmal nocturnal dyspnea and syncope.   Respiratory:  Negative for cough, shortness of breath and wheezing.    Musculoskeletal:  Negative for muscle weakness and myalgias.   Neurological:  Negative for dizziness and weakness.   All other systems reviewed and are negative.       Medical History[1]     Surgical History[2]     Social History     Socioeconomic History    Marital status:      Spouse name: Not on file    Number of children: Not on file    Years of education: Not on file    Highest education level: Not on file   Occupational History    Not on file   Tobacco Use    Smoking status: Never     Passive exposure: Never    Smokeless tobacco: Never   Vaping Use    Vaping  status: Never Used   Substance and Sexual Activity    Alcohol use: Not Currently    Drug use: Not Currently    Sexual activity: Defer   Other Topics Concern    Not on file   Social History Narrative    Not on file     Social Drivers of Health     Financial Resource Strain: Low Risk  (6/4/2025)    Overall Financial Resource Strain (CARDIA)     Difficulty of Paying Living Expenses: Not hard at all   Food Insecurity: No Food Insecurity (6/4/2025)    Hunger Vital Sign     Worried About Running Out of Food in the Last Year: Never true     Ran Out of Food in the Last Year: Never true   Transportation Needs: No Transportation Needs (6/4/2025)    PRAPARE - Transportation     Lack of Transportation (Medical): No     Lack of Transportation (Non-Medical): No   Physical Activity: Not on file   Stress: Not on file   Social Connections: Not on file   Intimate Partner Violence: Not At Risk (6/4/2025)    Humiliation, Afraid, Rape, and Kick questionnaire     Fear of Current or Ex-Partner: No     Emotionally Abused: No     Physically Abused: No     Sexually Abused: No   Housing Stability: Low Risk  (6/4/2025)    Housing Stability Vital Sign     Unable to Pay for Housing in the Last Year: No     Number of Times Moved in the Last Year: 1     Homeless in the Last Year: No        Family History[3]     OBJECTIVE:    Vitals:    07/15/25 0839   BP: 118/80   Pulse: 67   SpO2: 96%        Vitals reviewed.   Constitutional:       Appearance: Normal and healthy appearance. Not in distress.   Pulmonary:      Effort: Pulmonary effort is normal.      Breath sounds: Normal breath sounds.   Cardiovascular:      Normal rate. Regular rhythm. Normal S1. Normal S2.       Murmurs: There is no murmur.      No gallop.  No click.   Pulses:     Intact distal pulses.   Edema:     Peripheral edema absent.   Skin:     General: Skin is warm and dry.   Neurological:      General: No focal deficit present.          Lab Review:   Lab Results   Component Value Date     CHOL 132 03/27/2025    TRIG 100 03/27/2025    HDL 41 03/27/2025       Lab Results   Component Value Date    LDLCALC 72 03/27/2025        Anginal equivalent  Exertional symptoms in a patient with risk.  I am going to arrange for a coronary CT with heart flow to assess for ischemic heart disease.  The presence or absence of coronary artery disease is also an important determination when considering his risk for stroke in the setting of atrial fibrillation.  He wants to go back to cycling. Lifestyle modifications were discussed. I advocated for mediterranean and plant based eating. We also discussed exercise. 150 minutes a week of moderate intensity exercise is recommended per our ACC/AHA guidelines      Atrial fibrillation with RVR (Multi)  All EKGs and monitor results were reviewed.  He uses propranolol rather infrequently, a couple of times a year for tremors this was prior to his diagnosis of atrial fibrillation.  I am going to have him go back to using propranolol infrequently.  I am starting him on metoprolol for now twice daily.  I have instructed him to take 2 pills, 50 mg prior to his coronary CT only.  Will continue Eliquis for now but plan on discontinuing especially if his coronary CT fails to identify atherosclerosis.            [1]   Past Medical History:  Diagnosis Date    Anxiety     Asthma     Depression     GI bleed     History of blood transfusion     Pilonidal cyst     Sleep apnea     Sleep difficulties    [2]   Past Surgical History:  Procedure Laterality Date    APPENDECTOMY      Laparoscopic    COLONOSCOPY      PILONIDAL CYST DRAINAGE      UPPER GASTROINTESTINAL ENDOSCOPY      WISDOM TOOTH EXTRACTION     [3]   Family History  Problem Relation Name Age of Onset    Diabetes Mother      Tremor Mother      Other (car accident) Father          age 43    Tremor Sister

## 2025-07-15 NOTE — ASSESSMENT & PLAN NOTE
Exertional symptoms in a patient with risk.  I am going to arrange for a coronary CT with heart flow to assess for ischemic heart disease.  The presence or absence of coronary artery disease is also an important determination when considering his risk for stroke in the setting of atrial fibrillation.  He wants to go back to cycling. Lifestyle modifications were discussed. I advocated for mediterranean and plant based eating. We also discussed exercise. 150 minutes a week of moderate intensity exercise is recommended per our ACC/AHA guidelines

## 2025-07-15 NOTE — PATIENT INSTRUCTIONS
Please go back to taking propranolol as needed only for tremors. Will start you on metoprolol, 1 tablet twice daily for now. Please take 2 pills 1 hour prior to your CT scan. Schedule a followup after your scan to discuss results

## 2025-07-17 LAB
ATRIAL RATE: 69 BPM
P AXIS: 46 DEGREES
P OFFSET: 180 MS
P ONSET: 134 MS
PR INTERVAL: 162 MS
Q ONSET: 215 MS
QRS COUNT: 11 BEATS
QRS DURATION: 80 MS
QT INTERVAL: 378 MS
QTC CALCULATION(BAZETT): 405 MS
QTC FREDERICIA: 396 MS
R AXIS: -25 DEGREES
T AXIS: 28 DEGREES
T OFFSET: 404 MS
VENTRICULAR RATE: 69 BPM

## 2025-07-22 ENCOUNTER — OFFICE VISIT (OUTPATIENT)
Dept: CARDIOLOGY | Facility: CLINIC | Age: 55
End: 2025-07-22
Payer: COMMERCIAL

## 2025-07-22 VITALS
SYSTOLIC BLOOD PRESSURE: 130 MMHG | WEIGHT: 192 LBS | HEIGHT: 66 IN | DIASTOLIC BLOOD PRESSURE: 86 MMHG | OXYGEN SATURATION: 97 % | BODY MASS INDEX: 30.86 KG/M2 | HEART RATE: 61 BPM

## 2025-07-22 DIAGNOSIS — I48.91 ATRIAL FIBRILLATION WITH RAPID VENTRICULAR RESPONSE (MULTI): ICD-10-CM

## 2025-07-22 LAB
ATRIAL RATE: 61 BPM
P AXIS: 57 DEGREES
P OFFSET: 184 MS
P ONSET: 129 MS
PR INTERVAL: 172 MS
Q ONSET: 215 MS
QRS COUNT: 10 BEATS
QRS DURATION: 78 MS
QT INTERVAL: 370 MS
QTC CALCULATION(BAZETT): 372 MS
QTC FREDERICIA: 371 MS
R AXIS: -28 DEGREES
T AXIS: 21 DEGREES
T OFFSET: 400 MS
VENTRICULAR RATE: 61 BPM

## 2025-07-22 PROCEDURE — 99202 OFFICE O/P NEW SF 15 MIN: CPT

## 2025-07-22 PROCEDURE — 93005 ELECTROCARDIOGRAM TRACING: CPT | Performed by: INTERNAL MEDICINE

## 2025-07-22 PROCEDURE — 3008F BODY MASS INDEX DOCD: CPT | Performed by: INTERNAL MEDICINE

## 2025-07-22 PROCEDURE — 99204 OFFICE O/P NEW MOD 45 MIN: CPT | Performed by: INTERNAL MEDICINE

## 2025-07-22 PROCEDURE — 1036F TOBACCO NON-USER: CPT | Performed by: INTERNAL MEDICINE

## 2025-07-22 ASSESSMENT — COLUMBIA-SUICIDE SEVERITY RATING SCALE - C-SSRS
2. HAVE YOU ACTUALLY HAD ANY THOUGHTS OF KILLING YOURSELF?: NO
6. HAVE YOU EVER DONE ANYTHING, STARTED TO DO ANYTHING, OR PREPARED TO DO ANYTHING TO END YOUR LIFE?: NO
1. IN THE PAST MONTH, HAVE YOU WISHED YOU WERE DEAD OR WISHED YOU COULD GO TO SLEEP AND NOT WAKE UP?: NO

## 2025-07-22 ASSESSMENT — ENCOUNTER SYMPTOMS
DEPRESSION: 0
OCCASIONAL FEELINGS OF UNSTEADINESS: 0
LOSS OF SENSATION IN FEET: 0

## 2025-07-22 ASSESSMENT — PAIN SCALES - GENERAL: PAINLEVEL_OUTOF10: 0-NO PAIN

## 2025-07-22 NOTE — PROGRESS NOTES
"Referred by Dr. Iyer, Juliocesar CALDERON MD provider found for   Chief Complaint   Patient presents with    Atrial Fibrillation        Portillo Barrios is a 55 y.o. year old male patient with h/o DENG on CPAP, asthma, bipolar disorder, chronic tremor, lipoma of his leg status post excision 5/20/2025. [place] [facility] [place] and was found to be in A fib. Underwent TYRON/DCC with conversion back to NSR. Was referred to me for treatment options evaluation.     PMHx/PSHx: As above    FamHx: unremarkable     Allergies:  RX Allergies[1]     Review of Systems    Constitutional: not feeling tired.   Eyes: no eyesight problems.   ENT: no hearing loss and no nosebleeds.   Cardiovascular: no intermittent leg claudication and as noted in HPI.   Respiratory: no chronic cough and no shortness of breath.   Gastrointestinal: no change in bowel habits and no blood in stools.   Genitourinary: no urinary frequency and no hematuria.   Skin: no skin rashes.   Neurological: no seizures and no frequent falls.   Psychiatric: no depression and not suicidal.   All other systems have been reviewed and are negative for complaint.     Outpatient Medications:  Current Outpatient Medications   Medication Instructions    albuterol 90 mcg/actuation inhaler 2 puffs, inhalation, Every 6 hours PRN    Eliquis 5 mg, oral, Every 12 hours    lamoTRIgine (LAMICTAL) 200 mg, 2 times daily    metoprolol tartrate (LOPRESSOR) 25 mg, oral, 2 times daily         Last Recorded Vitals:      6/24/2025     3:22 PM 6/24/2025     4:00 PM 6/24/2025     5:00 PM 6/24/2025     6:00 PM 6/30/2025     3:33 PM 7/15/2025     8:39 AM 7/22/2025    10:02 AM   Vitals   Systolic 111 116 114 117 117 118 130   Diastolic 69 80 68 81 74 80 86   BP Location Left arm  Right arm  Left arm  Right arm   Heart Rate 70 58 68 68 77 67 61   Temp 36.6 °C (97.9 °F)    36.7 °C (98 °F)     Resp 18  15 14      Height 1.676 m (5' 6\")    1.676 m (5' 6\")  1.676 m (5' 6\")   Weight (lb) 190  " "  192 191 192   BMI 30.67 kg/m2    30.99 kg/m2 30.83 kg/m2 30.99 kg/m2   BSA (m2) 2 m2    2.01 m2 2.01 m2 2.01 m2   Visit Report     Report Report Report    Visit Vitals  /86 (BP Location: Right arm, Patient Position: Sitting, BP Cuff Size: Adult)   Pulse 61   Ht 1.676 m (5' 6\")   Wt 87.1 kg (192 lb)   SpO2 97%   BMI 30.99 kg/m²   Smoking Status Never   BSA 2.01 m²        Physical Exam:  Constitutional: alert and in no acute distress.   Eyes: no erythema, swelling or discharge from the eye .   Neck: neck is supple, symmetric, trachea midline, no masses  and no thyromegaly .   Pulmonary: no increased work of breathing or signs of respiratory distress  and lungs clear to auscultation.    Cardiovascular: carotid pulses 2+ bilaterally with no bruit , JVP was normal, no thrills , regular rhythm, normal S1 and S2, no murmurs , pedal pulses 2+ bilaterally  and no edema .   Abdomen: abdomen non-tender, no masses  and no hepatomegaly .   Skin: skin warm and dry, normal skin turgor .   Psychiatric judgment and insight is normal  and oriented to person, place and time .        Assessment/Plan   Problem List Items Addressed This Visit    None  Visit Diagnoses         Codes      Atrial fibrillation with rapid ventricular response (Multi)     I48.91    Relevant Orders    ECG 12 lead (Clinic Performed)            Portillo Barrios is a 55 y.o. year old male patient with h/o DENG on CPAP, asthma, bipolar disorder, chronic tremor, lipoma of his leg status post excision 5/20/2025. Recently admitted to [facility] [place] due to palpitations an SOB and was found to be in A fib. Underwent TYRON/DCC with conversion back to NSR. Was referred to me for treatment options evaluation.   His current ECG shows NSR with narrow QRS and HR of 61 bpm. His most recent echocardiogram showed mild decrease of LVEF (50%), normal size atrium (this was obtained during A fib). He reports that the admission was the first time he ever was diagnosed with A fib and had no " further episodes since discharged. He reports some SOB which is under evaluation by his cardiologist which is unrelated to rhythm issues. I will continue his current medications and follow up in 3 months. The patient was instructed to call my office if he experiences new episodes of palpitations.         Juliocesar Iyer MD  Cardiac Electrophysiology      Thank you very much for allowing me to participate in the care of this pleasant patient. Please do not hesitate to contact me with any further questions or concerns regarding his care.    **Disclaimer: This note was dictated by speech recognition, and every effort has been made to prevent any error in transcription, however minor errors may be present**         [1] No Known Allergies

## 2025-07-24 LAB
ANION GAP SERPL CALCULATED.4IONS-SCNC: 10 MMOL/L (CALC) (ref 7–17)
BUN SERPL-MCNC: 14 MG/DL (ref 7–25)
BUN/CREAT SERPL: 10 (CALC) (ref 6–22)
CALCIUM SERPL-MCNC: 9.5 MG/DL (ref 8.6–10.3)
CHLORIDE SERPL-SCNC: 104 MMOL/L (ref 98–110)
CO2 SERPL-SCNC: 24 MMOL/L (ref 20–32)
CREAT SERPL-MCNC: 1.39 MG/DL (ref 0.7–1.3)
EGFRCR SERPLBLD CKD-EPI 2021: 61 ML/MIN/1.73M2
GLUCOSE SERPL-MCNC: 94 MG/DL (ref 65–99)
POTASSIUM SERPL-SCNC: 4.2 MMOL/L (ref 3.5–5.3)
SODIUM SERPL-SCNC: 138 MMOL/L (ref 135–146)

## 2025-07-28 ENCOUNTER — HOSPITAL ENCOUNTER (OUTPATIENT)
Dept: RADIOLOGY | Facility: HOSPITAL | Age: 55
Discharge: HOME | End: 2025-07-28
Payer: COMMERCIAL

## 2025-07-28 ENCOUNTER — RESULTS FOLLOW-UP (OUTPATIENT)
Dept: CARDIOLOGY | Facility: HOSPITAL | Age: 55
End: 2025-07-28
Payer: COMMERCIAL

## 2025-07-28 VITALS
OXYGEN SATURATION: 100 % | RESPIRATION RATE: 16 BRPM | SYSTOLIC BLOOD PRESSURE: 100 MMHG | HEART RATE: 72 BPM | DIASTOLIC BLOOD PRESSURE: 61 MMHG

## 2025-07-28 DIAGNOSIS — I20.89 ANGINAL EQUIVALENT: ICD-10-CM

## 2025-07-28 PROCEDURE — 2500000001 HC RX 250 WO HCPCS SELF ADMINISTERED DRUGS (ALT 637 FOR MEDICARE OP): Performed by: INTERNAL MEDICINE

## 2025-07-28 PROCEDURE — 75574 CT ANGIO HRT W/3D IMAGE: CPT | Performed by: INTERNAL MEDICINE

## 2025-07-28 PROCEDURE — 75574 CT ANGIO HRT W/3D IMAGE: CPT

## 2025-07-28 PROCEDURE — 2550000001 HC RX 255 CONTRASTS: Performed by: INTERNAL MEDICINE

## 2025-07-28 RX ORDER — METOPROLOL TARTRATE 1 MG/ML
5 INJECTION, SOLUTION INTRAVENOUS ONCE AS NEEDED
Status: DISCONTINUED | OUTPATIENT
Start: 2025-07-28 | End: 2025-07-29 | Stop reason: HOSPADM

## 2025-07-28 RX ORDER — METOPROLOL TARTRATE 100 MG/1
100 TABLET ORAL ONCE
Status: DISCONTINUED | OUTPATIENT
Start: 2025-07-28 | End: 2025-07-29 | Stop reason: HOSPADM

## 2025-07-28 RX ORDER — NITROGLYCERIN 0.4 MG/1
0.8 TABLET SUBLINGUAL ONCE
Status: COMPLETED | OUTPATIENT
Start: 2025-07-28 | End: 2025-07-28

## 2025-07-28 RX ORDER — LORAZEPAM 2 MG/ML
0.5 INJECTION INTRAMUSCULAR EVERY 5 MIN PRN
Status: DISCONTINUED | OUTPATIENT
Start: 2025-07-28 | End: 2025-07-29 | Stop reason: HOSPADM

## 2025-07-28 RX ORDER — METOPROLOL TARTRATE 1 MG/ML
5 INJECTION, SOLUTION INTRAVENOUS ONCE
Status: DISCONTINUED | OUTPATIENT
Start: 2025-07-28 | End: 2025-07-29 | Stop reason: HOSPADM

## 2025-07-28 RX ORDER — METOPROLOL TARTRATE 100 MG/1
100 TABLET ORAL ONCE AS NEEDED
Status: DISCONTINUED | OUTPATIENT
Start: 2025-07-28 | End: 2025-07-29 | Stop reason: HOSPADM

## 2025-07-28 RX ADMIN — NITROGLYCERIN 0.8 MG: 0.4 TABLET SUBLINGUAL at 08:40

## 2025-07-28 RX ADMIN — IOHEXOL 90 ML: 350 INJECTION, SOLUTION INTRAVENOUS at 08:56

## 2025-07-28 ASSESSMENT — PAIN - FUNCTIONAL ASSESSMENT: PAIN_FUNCTIONAL_ASSESSMENT: 0-10

## 2025-07-28 ASSESSMENT — PAIN SCALES - GENERAL: PAINLEVEL_OUTOF10: 0 - NO PAIN

## 2025-07-28 NOTE — LETTER
July 28, 2025     Portillo Barrios  [address]  [facility] [address]      Dear Mr. Barrios:    Below are the results from your recent visit:    Resulted Orders   CT angio coronary art with heartflow if score >30%    Narrative    Interpreted By:  Shauna Stoner,   STUDY:  CT ANGIO CORONARY ART WITH HEARTFLOW IF SCORE >30%;  7/28/2025 8:56 am      INDICATION:  Signs/Symptoms:anginal equivalent.      ,I20.89 Other forms of angina pectoris      COMPARISON:  None.      ACCESSION NUMBER(S):  [id number]      ORDERING CLINICIAN:  PARISH DIEGO      TECHNIQUE:  Using multi-detector CT technology,  axial, sequential imaging with  prospective gating was performed of the chest following the  intravenous administration of 90 ML Omnipaque 350.      The patient was premedicated with metoprolol and 0.8 mg sublingual  nitroglycerin for heart rate control and coronary dilation,  respectively.      For optimization of anatomic evaluation, multiplanar reconstruction,  maximum intensity projections, and advanced 3-D off-line  postprocessing were performed on a dedicated stand-alone workstation  under the direct supervision of the interpreting physician.      FINDINGS:  POTENTIAL STUDY LIMITATIONS:  None.      CORONARY ARTERIES:  Coronary artery calcium score 0.      CORONARY ANATOMY:  There is normal origin of the coronary arteries.      LEFT MAIN CORONARY ARTERY:  The left main is normal sized vessel that  bifurcates into the LAD  and circumflex. There is no significant atherosclerotic change or  stenotic disease.      LEFT ANTERIOR DESCENDING ARTERY:  The LAD is a normal size vessel that  wraps around the apex.  It gives rise to   acute diagonal branches.  There is no significant atherosclerotic change or stenotic disease.      LEFT CIRCUMFLEX ARTERY:  The LCX is a normal size vessel, which is  dominant.  It gives rise to  2 obtuse marginal branches. In its distal segment  it supplies the PDA. There is no significant atherosclerotic  change  or stenotic disease.      RAMUS INTERMEDIUS:  The ramus is a normal sized vessel.  It gives rise to  1 obtuse marginal/diagonal branches.  There is no significant atherosclerotic change or stenotic disease.      RIGHT CORONARY ARTERY:  The RCA is a small size vessel, which is  non-dominant .  It gives rise to a  conus branch,  sonu branch, and  1 acute  marginal branches. There is no significant atherosclerotic change or  stenotic disease.      CARDIAC CHAMBERS:  The cardiac chambers demonstrate normal atrioventricular and  ventriculoarterial concordance, and systemic and pulmonary venous  return.      LEFT ATRIUM:  Normal size ( 17 - cm2)      RIGHT ATRIUM:  Normal size ( 20 - cm2)      INTERATRIAL SEPTUM:  Intact.      LEFT VENTRICLE:  Normal size      RIGHT VENTRICLE:  Normal size      AORTIC VALVE:  The aortic valve is  trileaflet in morphology.  No calcifications.      MITRAL VALVE:  No thickening/calcification.      THORACIC AORTA:  The visualized thoracic aorta is normal in course, caliber, and  contour. The aortic root (3.2 cm). There is no acute aortic  pathology, such as dissection, intramural hematoma, or contained  rupture. The aortic arch is not included on this examination.      PERICARDIUM:  There is no pericardial effusion of thickening.      CHEST:  The chest wall is normal.  No significant lymphadenopathy or mass is seen in limited images of  the mediastinum. Limited imaging through the lungs reveals no gross  abnormalities. No pleural effusion or pneumothorax.      UPPER ABDOMEN:  Diffusely reduced liver attenuation suggestive of possible hepatic  steatosis. Clinical correlation recommended.        Impression    1.  Normal coronary anatomy without evidence of atherosclerotic  changes or stenotic disease.  2. Left dominant coronary artery system.  3. Coronary artery calcium score 0.  4. Diffusely reduced liver attenuation suggestive of possible hepatic  steatosis. Clinical correlation  recommended.          MACRO:  None      Signed by: Shauna Stoner 7/28/2025 10:25 AM  Dictation workstation:   [id number]     This scan shows normal coronary arteries.  No further workup or intervention suggested at this juncture.    If you have any questions or concerns, please don't hesitate to call.         Sincerely,        Coleman Maharaj, DO

## 2025-07-29 ENCOUNTER — TELEPHONE (OUTPATIENT)
Dept: CARDIOLOGY | Facility: CLINIC | Age: 55
End: 2025-07-29
Payer: COMMERCIAL

## 2025-07-29 NOTE — TELEPHONE ENCOUNTER
Patient called to inquire about CT angio results, are you able to call the patient with those or should we set up an appointment? Please advise.

## 2025-07-31 DIAGNOSIS — I48.91 ATRIAL FIBRILLATION WITH RVR (MULTI): ICD-10-CM

## 2025-07-31 NOTE — TELEPHONE ENCOUNTER
Spoke w him about his normal coronary CT results. He has some liver findings, and was encouraged to follow up with his PCP in this regard

## 2025-08-05 ENCOUNTER — APPOINTMENT (OUTPATIENT)
Dept: PRIMARY CARE | Facility: CLINIC | Age: 55
End: 2025-08-05
Payer: COMMERCIAL

## 2025-08-05 VITALS
WEIGHT: 190 LBS | SYSTOLIC BLOOD PRESSURE: 118 MMHG | HEIGHT: 66 IN | OXYGEN SATURATION: 98 % | BODY MASS INDEX: 30.53 KG/M2 | DIASTOLIC BLOOD PRESSURE: 76 MMHG | HEART RATE: 70 BPM | TEMPERATURE: 97.3 F

## 2025-08-05 DIAGNOSIS — R06.02 SHORTNESS OF BREATH: ICD-10-CM

## 2025-08-05 DIAGNOSIS — K76.0 STEATOSIS, LIVER: ICD-10-CM

## 2025-08-05 DIAGNOSIS — R25.1 TREMOR: ICD-10-CM

## 2025-08-05 DIAGNOSIS — I48.0 PAROXYSMAL ATRIAL FIBRILLATION (MULTI): Primary | ICD-10-CM

## 2025-08-05 PROCEDURE — 99214 OFFICE O/P EST MOD 30 MIN: CPT | Performed by: FAMILY MEDICINE

## 2025-08-05 PROCEDURE — 1036F TOBACCO NON-USER: CPT | Performed by: FAMILY MEDICINE

## 2025-08-05 PROCEDURE — 3008F BODY MASS INDEX DOCD: CPT | Performed by: FAMILY MEDICINE

## 2025-08-05 ASSESSMENT — PAIN SCALES - GENERAL: PAINLEVEL_OUTOF10: 0-NO PAIN

## 2025-08-05 NOTE — PROGRESS NOTES
"Subjective   Patient ID: Portillo Barrios is a 55 y.o. male who presents for Follow-up (Discuss CT results done by Dr. Maharaj on 7/15/2025- ) and Hypertension (/Patient feels some shortness of breath after taking Metoprolol.   ).    HPI here for follow-up to recent CT scan.  Patient has CT scan done by his cardiologist to look for coronary disease and atherosclerotic disease.  His CT scan was very unremarkable except for fatty infiltration of the liver.  A further review indicates that patient had a CT scan done in 2021 that also showed steatosis at that time.  Patient had liver enzymes performed about a month ago and they were all normal.  Patient has a bilirubin level of 1.39 with normal extending up to 1.30.  Patient has no abdominal pain nor does comfort.  Patient has had his apixaban discontinued by cardiology due to the excellent results on the CT scan.  Patient is no longer in A-fib.  Patient does continue to use metoprolol and still having shortness of breath that is transient about an hour after ingesting the medication.  The shortness of breath last for about an hour and then spontaneously resolves.  His cardiologist is aware.  Patient has an intermittent tremor.  He can still use his atenolol as needed if the tremor worsens.    Review of Systems  Constitutional: Patient is negative for fever, fatigue, weight change.  HEENT: Patient is negative for change in vision, hearing, swallow.  Cardio: Patient is negative for chest pain, lower extremity edema.  Pulmonary: Patient is positive for shortness of breath occurring after using his beta-blocker.  He is negative for cough.    Objective   /76 (BP Location: Left arm)   Pulse 70   Temp 36.3 °C (97.3 °F)   Ht 1.676 m (5' 6\")   Wt 86.2 kg (190 lb)   SpO2 98%   BMI 30.67 kg/m²     Physical Exam  General: Awake and alert no apparent distress.  HEENT: Moist oral mucosa no cervical lymphadenopathy.  Cardio: Heart S1-S2 no murmur rub or gallop.  Pulmonary: " Lungs clear to auscultation bilaterally.    Assessment/Plan   Problem List Items Addressed This Visit           ICD-10-CM    Tremor chronic, intermittent.  Patient may use atenolol as needed. R25.1     Other Visit Diagnoses         Codes      Paroxysmal atrial fibrillation (Multi)    -  Primary resolved. I48.0      Shortness of breath    intermittent, chronic.  The shortness of breath is not disabling.  Patient is still able to ride his bicycle 20 miles at a time without ill effect. R06.02      Steatosis, liver    chronic, stable.  Patient's fears were and laid in describing fatty liver as a common condition that usually causes no harmful sequelae. K76.0

## 2025-09-12 ENCOUNTER — APPOINTMENT (OUTPATIENT)
Dept: SLEEP MEDICINE | Facility: CLINIC | Age: 55
End: 2025-09-12
Payer: COMMERCIAL

## (undated) DEVICE — GLOVE, SURGICAL, PROTEXIS PI W/NEU-THERA, 7.5, PF, LF

## (undated) DEVICE — SUTURE, MONOCRYL, 4-0, 18 IN, PS2, UNDYED

## (undated) DEVICE — DRAPE, SHEET, ENDOSCOPY, GENERAL, FENESTRATED, ARMBOARD COVER, 98 X 123.5 IN, DISPOSABLE, LF, STERILE

## (undated) DEVICE — ADHESIVE, SKIN, DERMABOND ADVANCED, 15CM, PEN-STYLE

## (undated) DEVICE — GLOVE, SURGICAL, PROTEXIS PI BLUE W/NEUTHERA, 8.0, PF, LF

## (undated) DEVICE — SUTURE, VICRYL PLUS 3-0, SH, 27IN

## (undated) DEVICE — PENCIL, SMOKE EVACUATION, VALLEYLAB

## (undated) DEVICE — Device

## (undated) DEVICE — TOWEL, SURGICAL, NEURO, O/R, 16 X 26, BLUE, STERILE